# Patient Record
Sex: MALE | Race: OTHER | Employment: UNEMPLOYED | URBAN - METROPOLITAN AREA
[De-identification: names, ages, dates, MRNs, and addresses within clinical notes are randomized per-mention and may not be internally consistent; named-entity substitution may affect disease eponyms.]

---

## 2023-01-15 ENCOUNTER — APPOINTMENT (OUTPATIENT)
Dept: GENERAL RADIOLOGY | Age: 68
DRG: 988 | End: 2023-01-15
Payer: MEDICAID

## 2023-01-15 ENCOUNTER — APPOINTMENT (OUTPATIENT)
Dept: CT IMAGING | Age: 68
DRG: 988 | End: 2023-01-15
Payer: MEDICAID

## 2023-01-15 ENCOUNTER — HOSPITAL ENCOUNTER (INPATIENT)
Age: 68
LOS: 1 days | Discharge: HOME | DRG: 988 | End: 2023-01-16
Attending: EMERGENCY MEDICINE | Admitting: FAMILY MEDICINE
Payer: MEDICAID

## 2023-01-15 DIAGNOSIS — C79.9 METASTATIC MALIGNANT NEOPLASM, UNSPECIFIED SITE (HCC): Primary | ICD-10-CM

## 2023-01-15 DIAGNOSIS — R06.02 SHORTNESS OF BREATH: ICD-10-CM

## 2023-01-15 PROBLEM — R62.7 FTT (FAILURE TO THRIVE) IN ADULT: Status: ACTIVE | Noted: 2023-01-15

## 2023-01-15 LAB
ABSOLUTE EOS #: 0.23 K/UL (ref 0–0.44)
ABSOLUTE IMMATURE GRANULOCYTE: 0.09 K/UL (ref 0–0.3)
ABSOLUTE LYMPH #: 2.68 K/UL (ref 1.1–3.7)
ABSOLUTE MONO #: 1.38 K/UL (ref 0.1–1.2)
ANION GAP SERPL CALCULATED.3IONS-SCNC: 9 MMOL/L (ref 9–17)
BASOPHILS # BLD: 1 % (ref 0–2)
BASOPHILS ABSOLUTE: 0.13 K/UL (ref 0–0.2)
BUN BLDV-MCNC: 12 MG/DL (ref 8–23)
BUN/CREAT BLD: 14 (ref 9–20)
CALCIUM SERPL-MCNC: 9.4 MG/DL (ref 8.6–10.4)
CHLORIDE BLD-SCNC: 102 MMOL/L (ref 98–107)
CO2: 29 MMOL/L (ref 20–31)
CREAT SERPL-MCNC: 0.87 MG/DL (ref 0.7–1.2)
EOSINOPHILS RELATIVE PERCENT: 2 % (ref 1–4)
FLU A ANTIGEN: NEGATIVE
FLU B ANTIGEN: NEGATIVE
GFR SERPL CREATININE-BSD FRML MDRD: >60 ML/MIN/1.73M2
GLUCOSE BLD-MCNC: 115 MG/DL (ref 70–99)
HCT VFR BLD CALC: 46.4 % (ref 40.7–50.3)
HEMOGLOBIN: 14.4 G/DL (ref 13–17)
IMMATURE GRANULOCYTES: 1 %
LYMPHOCYTES # BLD: 18 % (ref 24–43)
MCH RBC QN AUTO: 28.2 PG (ref 25.2–33.5)
MCHC RBC AUTO-ENTMCNC: 31 G/DL (ref 28.4–34.8)
MCV RBC AUTO: 90.8 FL (ref 82.6–102.9)
MONOCYTES # BLD: 9 % (ref 3–12)
MYOGLOBIN: 49 NG/ML (ref 28–72)
MYOGLOBIN: 50 NG/ML (ref 28–72)
NRBC AUTOMATED: 0 PER 100 WBC
PDW BLD-RTO: 13.6 % (ref 11.8–14.4)
PLATELET # BLD: 507 K/UL (ref 138–453)
PMV BLD AUTO: 9.1 FL (ref 8.1–13.5)
POTASSIUM SERPL-SCNC: 4.2 MMOL/L (ref 3.7–5.3)
PRO-BNP: 202 PG/ML
RBC # BLD: 5.11 M/UL (ref 4.21–5.77)
SARS-COV-2, RAPID: NOT DETECTED
SEG NEUTROPHILS: 69 % (ref 36–65)
SEGMENTED NEUTROPHILS ABSOLUTE COUNT: 10.43 K/UL (ref 1.5–8.1)
SODIUM BLD-SCNC: 140 MMOL/L (ref 135–144)
SPECIMEN DESCRIPTION: NORMAL
TROPONIN, HIGH SENSITIVITY: 8 NG/L (ref 0–22)
TROPONIN, HIGH SENSITIVITY: 8 NG/L (ref 0–22)
WBC # BLD: 14.9 K/UL (ref 3.5–11.3)

## 2023-01-15 PROCEDURE — 94761 N-INVAS EAR/PLS OXIMETRY MLT: CPT

## 2023-01-15 PROCEDURE — 73700 CT LOWER EXTREMITY W/O DYE: CPT

## 2023-01-15 PROCEDURE — 6360000002 HC RX W HCPCS: Performed by: NURSE PRACTITIONER

## 2023-01-15 PROCEDURE — 80048 BASIC METABOLIC PNL TOTAL CA: CPT

## 2023-01-15 PROCEDURE — 73502 X-RAY EXAM HIP UNI 2-3 VIEWS: CPT

## 2023-01-15 PROCEDURE — 93005 ELECTROCARDIOGRAM TRACING: CPT | Performed by: PHYSICIAN ASSISTANT

## 2023-01-15 PROCEDURE — 6360000002 HC RX W HCPCS: Performed by: PHYSICIAN ASSISTANT

## 2023-01-15 PROCEDURE — 85025 COMPLETE CBC W/AUTO DIFF WBC: CPT

## 2023-01-15 PROCEDURE — 1200000000 HC SEMI PRIVATE

## 2023-01-15 PROCEDURE — 6370000000 HC RX 637 (ALT 250 FOR IP): Performed by: NURSE PRACTITIONER

## 2023-01-15 PROCEDURE — 2580000003 HC RX 258: Performed by: NURSE PRACTITIONER

## 2023-01-15 PROCEDURE — 83874 ASSAY OF MYOGLOBIN: CPT

## 2023-01-15 PROCEDURE — 94640 AIRWAY INHALATION TREATMENT: CPT

## 2023-01-15 PROCEDURE — 99223 1ST HOSP IP/OBS HIGH 75: CPT | Performed by: NURSE PRACTITIONER

## 2023-01-15 PROCEDURE — 2580000003 HC RX 258: Performed by: PHYSICIAN ASSISTANT

## 2023-01-15 PROCEDURE — 83880 ASSAY OF NATRIURETIC PEPTIDE: CPT

## 2023-01-15 PROCEDURE — 71045 X-RAY EXAM CHEST 1 VIEW: CPT

## 2023-01-15 PROCEDURE — 87635 SARS-COV-2 COVID-19 AMP PRB: CPT

## 2023-01-15 PROCEDURE — 96374 THER/PROPH/DIAG INJ IV PUSH: CPT

## 2023-01-15 PROCEDURE — 6360000004 HC RX CONTRAST MEDICATION: Performed by: PHYSICIAN ASSISTANT

## 2023-01-15 PROCEDURE — 99255 IP/OBS CONSLTJ NEW/EST HI 80: CPT | Performed by: INTERNAL MEDICINE

## 2023-01-15 PROCEDURE — 84484 ASSAY OF TROPONIN QUANT: CPT

## 2023-01-15 PROCEDURE — 87804 INFLUENZA ASSAY W/OPTIC: CPT

## 2023-01-15 PROCEDURE — 71260 CT THORAX DX C+: CPT | Performed by: PHYSICIAN ASSISTANT

## 2023-01-15 PROCEDURE — 99285 EMERGENCY DEPT VISIT HI MDM: CPT

## 2023-01-15 RX ORDER — SODIUM CHLORIDE 9 MG/ML
INJECTION, SOLUTION INTRAVENOUS CONTINUOUS
Status: DISCONTINUED | OUTPATIENT
Start: 2023-01-15 | End: 2023-01-16 | Stop reason: HOSPADM

## 2023-01-15 RX ORDER — SODIUM CHLORIDE 0.9 % (FLUSH) 0.9 %
10 SYRINGE (ML) INJECTION ONCE
Status: COMPLETED | OUTPATIENT
Start: 2023-01-15 | End: 2023-01-15

## 2023-01-15 RX ORDER — ALBUTEROL SULFATE 2.5 MG/3ML
2.5 SOLUTION RESPIRATORY (INHALATION) ONCE
Status: COMPLETED | OUTPATIENT
Start: 2023-01-15 | End: 2023-01-15

## 2023-01-15 RX ORDER — 0.9 % SODIUM CHLORIDE 0.9 %
80 INTRAVENOUS SOLUTION INTRAVENOUS ONCE
Status: COMPLETED | OUTPATIENT
Start: 2023-01-15 | End: 2023-01-15

## 2023-01-15 RX ORDER — SODIUM CHLORIDE 0.9 % (FLUSH) 0.9 %
10 SYRINGE (ML) INJECTION PRN
Status: DISCONTINUED | OUTPATIENT
Start: 2023-01-15 | End: 2023-01-16 | Stop reason: HOSPADM

## 2023-01-15 RX ORDER — ALBUTEROL SULFATE 2.5 MG/3ML
2.5 SOLUTION RESPIRATORY (INHALATION)
Status: DISCONTINUED | OUTPATIENT
Start: 2023-01-15 | End: 2023-01-16 | Stop reason: HOSPADM

## 2023-01-15 RX ORDER — LEVOFLOXACIN 500 MG/1
750 TABLET, FILM COATED ORAL DAILY
Status: DISCONTINUED | OUTPATIENT
Start: 2023-01-15 | End: 2023-01-16 | Stop reason: HOSPADM

## 2023-01-15 RX ORDER — MAGNESIUM SULFATE 1 G/100ML
1000 INJECTION INTRAVENOUS PRN
Status: DISCONTINUED | OUTPATIENT
Start: 2023-01-15 | End: 2023-01-16 | Stop reason: HOSPADM

## 2023-01-15 RX ORDER — POTASSIUM CHLORIDE 20 MEQ/1
40 TABLET, EXTENDED RELEASE ORAL PRN
Status: DISCONTINUED | OUTPATIENT
Start: 2023-01-15 | End: 2023-01-16 | Stop reason: HOSPADM

## 2023-01-15 RX ORDER — ACETAMINOPHEN 650 MG/1
650 SUPPOSITORY RECTAL EVERY 6 HOURS PRN
Status: DISCONTINUED | OUTPATIENT
Start: 2023-01-15 | End: 2023-01-16 | Stop reason: HOSPADM

## 2023-01-15 RX ORDER — POTASSIUM CHLORIDE 7.45 MG/ML
10 INJECTION INTRAVENOUS PRN
Status: DISCONTINUED | OUTPATIENT
Start: 2023-01-15 | End: 2023-01-16 | Stop reason: HOSPADM

## 2023-01-15 RX ORDER — ENOXAPARIN SODIUM 100 MG/ML
40 INJECTION SUBCUTANEOUS DAILY
Status: DISCONTINUED | OUTPATIENT
Start: 2023-01-15 | End: 2023-01-16 | Stop reason: HOSPADM

## 2023-01-15 RX ORDER — 0.9 % SODIUM CHLORIDE 0.9 %
1000 INTRAVENOUS SOLUTION INTRAVENOUS ONCE
Status: COMPLETED | OUTPATIENT
Start: 2023-01-15 | End: 2023-01-15

## 2023-01-15 RX ORDER — ACETAMINOPHEN 325 MG/1
650 TABLET ORAL EVERY 6 HOURS PRN
Status: DISCONTINUED | OUTPATIENT
Start: 2023-01-15 | End: 2023-01-16 | Stop reason: HOSPADM

## 2023-01-15 RX ORDER — OXYCODONE HYDROCHLORIDE AND ACETAMINOPHEN 5; 325 MG/1; MG/1
1 TABLET ORAL EVERY 4 HOURS PRN
Status: DISCONTINUED | OUTPATIENT
Start: 2023-01-15 | End: 2023-01-16 | Stop reason: HOSPADM

## 2023-01-15 RX ORDER — SODIUM CHLORIDE 9 MG/ML
INJECTION, SOLUTION INTRAVENOUS PRN
Status: DISCONTINUED | OUTPATIENT
Start: 2023-01-15 | End: 2023-01-16 | Stop reason: HOSPADM

## 2023-01-15 RX ORDER — DIPHENHYDRAMINE HYDROCHLORIDE 50 MG/ML
12.5 INJECTION INTRAMUSCULAR; INTRAVENOUS ONCE
Status: COMPLETED | OUTPATIENT
Start: 2023-01-15 | End: 2023-01-15

## 2023-01-15 RX ORDER — FENTANYL CITRATE 0.05 MG/ML
50 INJECTION, SOLUTION INTRAMUSCULAR; INTRAVENOUS
Status: DISCONTINUED | OUTPATIENT
Start: 2023-01-15 | End: 2023-01-16 | Stop reason: HOSPADM

## 2023-01-15 RX ORDER — IPRATROPIUM BROMIDE AND ALBUTEROL SULFATE 2.5; .5 MG/3ML; MG/3ML
1 SOLUTION RESPIRATORY (INHALATION)
Status: DISCONTINUED | OUTPATIENT
Start: 2023-01-15 | End: 2023-01-16

## 2023-01-15 RX ORDER — ONDANSETRON 2 MG/ML
4 INJECTION INTRAMUSCULAR; INTRAVENOUS EVERY 6 HOURS PRN
Status: DISCONTINUED | OUTPATIENT
Start: 2023-01-15 | End: 2023-01-16 | Stop reason: HOSPADM

## 2023-01-15 RX ORDER — POLYETHYLENE GLYCOL 3350 17 G/17G
17 POWDER, FOR SOLUTION ORAL DAILY PRN
Status: DISCONTINUED | OUTPATIENT
Start: 2023-01-15 | End: 2023-01-16 | Stop reason: HOSPADM

## 2023-01-15 RX ORDER — ONDANSETRON 4 MG/1
4 TABLET, ORALLY DISINTEGRATING ORAL EVERY 8 HOURS PRN
Status: DISCONTINUED | OUTPATIENT
Start: 2023-01-15 | End: 2023-01-16 | Stop reason: HOSPADM

## 2023-01-15 RX ORDER — KETOROLAC TROMETHAMINE 15 MG/ML
15 INJECTION, SOLUTION INTRAMUSCULAR; INTRAVENOUS EVERY 6 HOURS PRN
Status: DISCONTINUED | OUTPATIENT
Start: 2023-01-15 | End: 2023-01-16 | Stop reason: HOSPADM

## 2023-01-15 RX ORDER — SODIUM CHLORIDE 0.9 % (FLUSH) 0.9 %
5-40 SYRINGE (ML) INJECTION EVERY 12 HOURS SCHEDULED
Status: DISCONTINUED | OUTPATIENT
Start: 2023-01-15 | End: 2023-01-16 | Stop reason: HOSPADM

## 2023-01-15 RX ORDER — SODIUM CHLORIDE 9 MG/ML
INJECTION, SOLUTION INTRAVENOUS CONTINUOUS
Status: DISCONTINUED | OUTPATIENT
Start: 2023-01-15 | End: 2023-01-15

## 2023-01-15 RX ADMIN — SODIUM CHLORIDE 1000 ML: 9 INJECTION, SOLUTION INTRAVENOUS at 12:46

## 2023-01-15 RX ADMIN — SODIUM CHLORIDE: 9 INJECTION, SOLUTION INTRAVENOUS at 13:26

## 2023-01-15 RX ADMIN — SODIUM CHLORIDE 80 ML: 9 INJECTION, SOLUTION INTRAVENOUS at 13:47

## 2023-01-15 RX ADMIN — IOPAMIDOL 75 ML: 755 INJECTION, SOLUTION INTRAVENOUS at 13:47

## 2023-01-15 RX ADMIN — DIPHENHYDRAMINE HYDROCHLORIDE 12.5 MG: 50 INJECTION, SOLUTION INTRAMUSCULAR; INTRAVENOUS at 14:49

## 2023-01-15 RX ADMIN — LEVOFLOXACIN 750 MG: 500 TABLET, FILM COATED ORAL at 21:34

## 2023-01-15 RX ADMIN — ALBUTEROL SULFATE 2.5 MG: 2.5 SOLUTION RESPIRATORY (INHALATION) at 15:40

## 2023-01-15 RX ADMIN — IPRATROPIUM BROMIDE AND ALBUTEROL SULFATE 1 AMPULE: 2.5; .5 SOLUTION RESPIRATORY (INHALATION) at 18:11

## 2023-01-15 RX ADMIN — OXYCODONE AND ACETAMINOPHEN 1 TABLET: 5; 325 TABLET ORAL at 21:43

## 2023-01-15 RX ADMIN — SODIUM CHLORIDE, PRESERVATIVE FREE 10 ML: 5 INJECTION INTRAVENOUS at 13:47

## 2023-01-15 RX ADMIN — SODIUM CHLORIDE: 9 INJECTION, SOLUTION INTRAVENOUS at 21:39

## 2023-01-15 RX ADMIN — ENOXAPARIN SODIUM 40 MG: 100 INJECTION SUBCUTANEOUS at 21:39

## 2023-01-15 ASSESSMENT — PAIN SCALES - GENERAL
PAINLEVEL_OUTOF10: 8
PAINLEVEL_OUTOF10: 7

## 2023-01-15 ASSESSMENT — ENCOUNTER SYMPTOMS
VOMITING: 1
BACK PAIN: 1
COUGH: 1
ABDOMINAL PAIN: 1
NAUSEA: 1
PHOTOPHOBIA: 0
SINUS PAIN: 0
CHEST TIGHTNESS: 1
SINUS PRESSURE: 0
SHORTNESS OF BREATH: 1
DIARRHEA: 0
COLOR CHANGE: 0

## 2023-01-15 ASSESSMENT — PAIN DESCRIPTION - LOCATION: LOCATION: HIP

## 2023-01-15 ASSESSMENT — PAIN DESCRIPTION - ORIENTATION: ORIENTATION: LEFT

## 2023-01-15 ASSESSMENT — PAIN - FUNCTIONAL ASSESSMENT: PAIN_FUNCTIONAL_ASSESSMENT: 0-10

## 2023-01-15 NOTE — ED NOTES
Pt states chest pain on left side started a couple days ago. Pt states the pain radiates from left should down the arm. Pt states he has tender left hip pain when touched. Pt has nonproductive cough with congestion. Pt states he has no prior cardiac hx. Pt placed on cardiac monitor. Family at bedside. Will continue to monitor.      Bhavin Coyne RN  01/15/23 2640

## 2023-01-15 NOTE — PROGRESS NOTES
Patient to floor via wheelchair from ED. Patient oriented to the room,given call light and made comfortable. VVS  Consultations complete. Bed locked and lowered. Area well lit and free of clutter. Family in room.

## 2023-01-15 NOTE — ED NOTES
ED to inpatient nurses report     Chief Complaint   Patient presents with    Cough    Shortness of Breath    Chest Pain      Present to ED from home with chest pain on the left side that radiated down left arm and shortness of breath. Pt states they were recently see at [facility] within the last couple days but unable to find the encounter.   LOC: alert and orientated to name, place, date  Vital signs   Vitals:    01/15/23 1420 01/15/23 1438 01/15/23 1439 01/15/23 1445   BP: 109/77 116/85  122/89   Pulse: 82  82 80   Resp: 18  15 14   Temp:       TempSrc:       SpO2: 97%  98% 96%   Weight:       Height:          Oxygen Baseline RA    Current needs required none       LDAs:   Peripheral IV 01/15/23 Right Antecubital (Active)     Mobility: Independent  Fall Risk:    Pending ED orders: none  Present condition: stable    Consults: IP CONSULT TO HOSPITALIST  [x]  Hospitalist  Completed  [] yes [] no Who:   []  Medicine  Completed  [] yes [] No Who:   []  Cardiology  Completed  [] yes [] No Who:   []  GI   Completed  [] yes [] No Who:   []  Neurology  Completed  [] yes [] No Who:   []  Nephrology Completed  [] yes [] No Who:    []  Vascular  Completed  [] yes [] No Who:   []  Ortho  Completed  [] yes [] No Who:     []  Surgery  Completed  [] yes [] No Who:    []  Urology  Completed  [] yes [] No Who:    []  CT Surgery Completed  [] yes [] No Who:   []  Podiatry  Completed  [] yes [] No Who:    []  Other    Completed  [] yes [] No Who:  Interventions: IV Fluids, COVID, FLU, CT CHEST PE, CXR and XR Lft hip, CT Lft hip  Important Events: CXR showed mass, Hip XR possible fracture, CT of hip negative for fracture, CT PE negative for PE but showed mass, WBC 14.9, COVID/FLU negative       Electronically signed by Mikayla Allison RN on 1/15/2023 at 3:20 PM     Mikayla Allison RN  01/15/23 0369

## 2023-01-15 NOTE — ED PROVIDER NOTES
eMERGENCY dEPARTMENT eNCOUnter   [address] [place] Name: Katherine Kim  MRN: [id number]  [place] 1955  Date of evaluation: 1/15/23     Katherine Kim is a 74 y.o. male with CC: Cough, Shortness of Breath, and Chest Pain        This visit was performed by both a physician and an APC. I performed all aspects of the MDM as documented. Dr. Joe Thapa called to give me some background on the patient's. Apparently the patient was recently diagnosed with a lung mass with metastases.     The care is provided during an unprecedented national emergency due to the novel coronavirus, Guillermo Mars MD  Attending Emergency Physician           Christina Gutierrez MD  01/15/23 4712       Christina Gutierrez MD  01/15/23 4556

## 2023-01-15 NOTE — PLAN OF CARE
Problem: Respiratory - Adult  Goal: Achieves optimal ventilation and oxygenation  1/15/2023 1819 by William Lewis RCP  Outcome: Progressing  1/15/2023 1819 by William Lewis RCP  Outcome: Progressing  Flowsheets (Taken 1/15/2023 1819)  Achieves optimal ventilation and oxygenation:   Assess for changes in respiratory status   Respiratory therapy support as indicated  Note: BRONCHOSPASM/BRONCHOCONSTRICTION    IMPROVE  AERATION/BREATHSOUNDS  ADMINISTER BRONCHODILATOR THERAPY AS APPROPRIATE  ASSESS BREATH SOUNDS  PATIENT EDUCATION AS NEEDED

## 2023-01-15 NOTE — ED NOTES
Pt states he has two new red bumps on side of face and one on the neck. Pt denies any known allergies. Pt denies itching. M. Holley Sandhoff PA notified. Will continue to monitor.      Miky Reynolds RN  01/15/23 4934

## 2023-01-15 NOTE — PLAN OF CARE
Problem: Respiratory - Adult  Goal: Achieves optimal ventilation and oxygenation  1/15/2023 1819 by Buffy Fabian RCP  Outcome: Progressing

## 2023-01-15 NOTE — CONSULTS
_                         Today's Date: 1/15/2023  Patient Name: Nik Camara  Date of admission: 1/15/2023 12:28 PM  Patient's age: 74 y.o., 1955  Admission Dx: Shortness of breath [R06.02]  Intractable pain [R52]  Metastatic malignant neoplasm, unspecified site [facility]) [C79.9]      Requesting Physician: Nathan Reeder MD    CHIEF COMPLAINT: Chest pain. Pelvic pain. Consult for lung mass. History Obtained From:  patient, electronic medical record    HISTORY OF PRESENT ILLNESS:      The patient is a 74 y.o. [address] [facility] male who is admitted to the hospital for further management of newly diagnosed right lung mass. Patient has history of chronic tobacco abuse. He smokes about 2 packs/day for the last 40 years or more. Patient had no cough sputum or hemoptysis. He had chest pain mainly left side on and off for the last few days. He also had pain in the hips over the last several weeks. Pain is mainly in the left pelvic bone. No other complaints today for some time. No GI symptoms. No nausea or vomiting. No urinary symptoms. No other complaints. He was seen emergency room and he had CT scan of the chest PE protocol. No evidence of pulmonary embolism. He had mediastinal right hilar adenopathy. He had a right upper lobe mass in the medial aspect measuring 2.4 x 1.4 cm. He had a right upper lobe perihilar mass with invasion into mediastinum measuring 3 x 3.9 x 4 cm. Pelvic CT scan showed osteolytic and osteoclastic lesions. Smoker of 2 packs/day for the last 40 years. Past Medical History:   has no past medical history on file. Past Surgical History:   has no past surgical history on file. Family History: family history is not on file. Social History:   reports that he has been smoking cigarettes. He has been smoking an average of 2 packs per day.  He has never used smokeless tobacco. He reports that he does not drink alcohol and does not use drugs.    Medications:    Prior to Admission medications    Not on File     Current Facility-Administered Medications   Medication Dose Route Frequency Provider Last Rate Last Admin    sodium chloride flush 0.9 % injection 5-40 mL  5-40 mL IntraVENous 2 times per day Roselia Newness, APRN - NP        sodium chloride flush 0.9 % injection 10 mL  10 mL IntraVENous PRN Roselia Newness, APRN - NP        0.9 % sodium chloride infusion   IntraVENous PRN Roselia Newness, APRN - NP        potassium chloride (KLOR-CON M) extended release tablet 40 mEq  40 mEq Oral PRN Roselia Newness, APRN - NP        Or    potassium bicarb-citric acid (EFFER-K) effervescent tablet 40 mEq  40 mEq Oral PRN Roselia Newness, APRN - NP        Or    potassium chloride 10 mEq/100 mL IVPB (Peripheral Line)  10 mEq IntraVENous PRN [facility], APRN - NP        magnesium sulfate 1000 mg in dextrose 5% 100 mL IVPB  1,000 mg IntraVENous PRN Roselia [facility], APRN - NP        enoxaparin (LOVENOX) injection 40 mg  40 mg SubCUTAneous Daily [facility], APRN - NP        ondansetron (ZOFRAN-ODT) disintegrating tablet 4 mg  4 mg Oral Q8H PRN Roselia [facility], APRN - NP        Or    ondansetron (ZOFRAN) injection 4 mg  4 mg IntraVENous Q6H PRN Roselia [facility], APRN - NP        polyethylene glycol (GLYCOLAX) packet 17 g  17 g Oral Daily PRN Roselia Newness, APRN - NP        acetaminophen (TYLENOL) tablet 650 mg  650 mg Oral Q6H PRN Roselia Newness, APRN - NP        Or    acetaminophen (TYLENOL) suppository 650 mg  650 mg Rectal Q6H PRN Roselia [facility], APRN - NP        ipratropium-albuterol (DUONEB) nebulizer solution 1 ampule  1 ampule Inhalation Q4H WA Roselia Newness, APRN - NP        oxyCODONE-acetaminophen (PERCOCET) 5-325 MG per tablet 1 tablet  1 tablet Oral Q4H PRN Roselia Newness, APRN - NP        fentaNYL (SUBLIMAZE) injection 50 mcg  50 mcg IntraVENous Q1H PRN Roselia Newness, APRN - NP        ketorolac (TORADOL) injection 15 mg  15 mg IntraVENous Q6H PRN Minus Pee, APRN - NP        levoFLOXacin [facility]) tablet 750 mg  750 mg Oral Daily Minus Pee, APRN - NP        0.9 % sodium chloride infusion   IntraVENous Continuous Minus Pee, APRN - NP           Allergies:  Patient has no known allergies. REVIEW OF SYSTEMS:      General: Positive for weakness and fatigue. No unanticipated weight loss or decreased appetite. No fever or chills. Eyes: No blurred vision, eye pain or double vision. Ears: No hearing problems or drainage. No tinnitus. Throat: No sore throat, problems with swallowing or dysphagia. Respiratory: As above. No cough, sputum or hemoptysis. No shortness of breath. No pleuritic chest pain. Cardiovascular: No chest pain, orthopnea or PND. No lower extremity edema. No palpitation. Gastrointestinal: No problems with swallowing. No abdominal pain or bloating. No nausea or vomiting. No diarrhea or constipation. No GI bleeding. Genitourinary: No dysuria, hematuria, frequency or urgency. Musculoskeletal: As above. .  Dermatologic: No skin rashes or pruritus. No skin lesions or discolorations. Psychiatric: No depression, anxiety, or stress or signs of schizophrenia. No change in mood or affect. Hematologic: No history of bleeding tendency. No bruises or ecchymosis. No history of clotting problems. Infectious disease: No fever, chills or frequent infections. Endocrine: No polydipsia or polyuria. No temperature intolerance. Neurologic: No headaches or dizziness. No weakness or numbness of the extremities. No changes in balance, coordination,  memory, mentation, behavior. Allergic/Immunologic: No nasal congestion or hives. No repeated infections.        PHYSICAL EXAM:      /79   Pulse 87   Temp 99 °F (37.2 °C) (Oral)   Resp 18   Ht 5' 6\" (1.676 m)   Wt 163 lb (73.9 kg)   SpO2 95%   BMI 26.31 kg/m²    Temp (24hrs), Av.8 °F (37.1 °C), Min:98.5 °F (36.9 °C), Max:99 °F (37.2 °C)      General appearance - not in pain or distress  Mental status - alert and oriented  Eyes - pupils equal and reactive, extraocular eye movements intact  Ears - bilateral TM's and external ear canals normal  Nose - normal and patent, no erythema, discharge or polyps  Mouth - mucous membranes moist, pharynx normal without lesions  Neck - supple, no significant adenopathy  Lymphatics - no palpable lymphadenopathy, no hepatosplenomegaly  Chest - clear to auscultation, no wheezes, rales or rhonchi, symmetric air entry  Heart - normal rate, regular rhythm, normal S1, S2, no murmurs, rubs, clicks or gallops  Abdomen - soft, nontender, nondistended, no masses or organomegaly  Neurological - alert, oriented, normal speech, no focal findings or movement disorder noted  Musculoskeletal -tenderness over the left pelvic bone. No deformity or swelling  Extremities - peripheral pulses normal, no pedal edema, no clubbing or cyanosis  Skin - normal coloration and turgor, no rashes, no suspicious skin lesions noted           DATA:      Labs:       CBC:   Recent Labs     01/15/23  1245   WBC 14.9*   HGB 14.4   HCT 46.4   *     BMP:   Recent Labs     01/15/23  1245      K 4.2   CO2 29   BUN 12   CREATININE 0.87   LABGLOM >60   GLUCOSE 115*     PT/INR: No results for input(s): PROTIME, INR in the last 72 hours. APTT:No results for input(s): APTT in the last 72 hours. LIVER PROFILE:No results for input(s): AST, ALT, LABALBU in the last 72 hours. CT CHEST PULMONARY EMBOLISM W CONTRAST  Narrative: EXAMINATION:  CTA OF THE CHEST 1/15/2023 1:33 pm    TECHNIQUE:  CTA of the chest was performed after the administration of intravenous  contrast.  Multiplanar reformatted images are provided for review. MIP  images are provided for review.  Automated exposure control, iterative  reconstruction, and/or weight based adjustment of the mA/kV was utilized to  reduce the radiation dose to as low as reasonably achievable. COMPARISON:  None. HISTORY:  ORDERING SYSTEM PROVIDED HISTORY: mass. ..rule out pe and pneumonia as wel  TECHNOLOGIST PROVIDED HISTORY:  mass. ..rule out pe and pneumonia as wel  Decision Support Exception - unselect if not a suspected or confirmed  emergency medical condition->Emergency Medical Condition (MA)  Reason for Exam: Mass? R/o PE and pneumonia. Chest pain, SOB, cough    FINDINGS:  Pulmonary Arteries: Pulmonary arteries are adequately opacified for  evaluation. No evidence of intraluminal filling defect to suggest pulmonary  embolism. Main pulmonary artery is normal in caliber. Mediastinum: Mediastinal and right hilar adenopathy are noted. No acute  aortic abnormality. Heart size is normal.  No pericardial effusion or  epicardial adenopathy is noted. Esophageal walls are thickened mid and  distal area. Hiatal hernia is noted with asymmetry in the walls. Underlying  mass difficult to exclude. Lungs/pleura: Emphysematous changes are present in the lungs. A mass in the  right upper lobe medially of 2.3 x 1.4 cm is noted image 29, series 4. A  right perihilar upper lobe mass with invasion into the mediastinum of 3.0 x  3.9 x 4 cm is noted. Attenuation of the bronchi to the right upper and right  lower lobes is noted due to the mass and mediastinal adenopathy. Trachea is  patent. No effusion. Right lower lobe subpleural nodule of 4.8 mm. Upper Abdomen: Hepatic steatosis is noted. Atherosclerotic calcification of  the aorta and major branches is noted. Soft Tissues/Bones: Multilevel degenerative changes are present in the spine. No axillary adenopathy. Lytic destruction of 2.3 cm is noted T10. Impression: 1. No evidence of pulmonary embolus. 2.  Mediastinal right hilar adenopathy. 3.  Right upper lobe mass medial aspect 2.3 x 1.4 cm. 4.  Right upper lobe perihilar mass with invasion into the mediastinum a 3 x  3.9 x 4 cm.     5.  Right lower lobe subpleural nodule 4.8 mm.    6.  Hepatic steatosis.  Atherosclerotic disease.    7.  Lytic lesion T10.    RECOMMENDATIONS:  Advise consideration of tissue sampling of the right pulmonary masses.  PET-CT imaging may be considered.  CT HIP LEFT WO CONTRAST  Narrative: EXAMINATION:  CT OF THE LEFT HIP WITHOUT CONTRAST 1/15/2023 10:51 am    TECHNIQUE:  CT of the left hip was performed without the administration of intravenous  contrast.  Multiplanar reformatted images are provided for review. Automated  exposure control, iterative reconstruction, and/or weight based adjustment of  the mA/kV was utilized to reduce the radiation dose to as low as reasonably  achievable.    COMPARISON:  Left hip radiograph performed earlier today.    HISTORY  ORDERING SYSTEM PROVIDED HISTORY: rule out fx.  TECHNOLOGIST PROVIDED HISTORY: Rule out fx.  Decision Support Exception - unselect if not a suspected or confirmed  emergency medical condition->Emergency Medical Condition (MA)  Reason for Exam: Hip pain, r/o fracture    FINDINGS:  There is a lytic, destructive lesion seen within the anterior left iliac wing  measuring just under 2 cm (2, 18).  Another example of lytic lesion is seen  within the right parasymphyseal pubic bone measuring 2.4 cm (2, 71).  Additional lytic and sclerotic lesions are found throughout the pelvis.    With that said, no acute pelvic ring or obturator ring fractures are found.    Evaluation of the left hip shows no stress, insufficiency, or traumatic  fracture.  No trochanteric fractures found.  Finding on the radiograph was  artifactual.  No joint effusion is seen on the left.  Mild degenerative  changes are noted.    On the right, no stress, insufficiency, or traumatic fractures.  Mild  degenerative changes.  No joint effusion.    Limited imaging of the pelvic viscera is unremarkable.  Impression: No acute left hip abnormality identified.  Specifically, no fracture  identified.  The finding on the radiograph was  artifactual.    Multifocal lytic and sclerotic lesions throughout the pelvis, compatible with  bony metastatic disease. XR HIP LEFT (2-3 VIEWS)  Narrative: EXAMINATION:  TWO XRAY VIEWS OF THE LEFT HIP    1/15/2023 12:52 pm    COMPARISON:  None. HISTORY:  ORDERING SYSTEM PROVIDED HISTORY: Pain  TECHNOLOGIST PROVIDED HISTORY:  Pain  Reason for Exam: Left hip pain    Initial encounter    FINDINGS:  Subtle cortical step-off is noted along the greater trochanter seen only on a  single frontal view. The left hip joint is otherwise maintained. No other  acute fracture or dislocation. Joint spaces and alignment are otherwise  maintained. Soft tissues are unremarkable. Impression: Subtle cortical step-off is noted along the greater trochanter seen only on  single frontal view and a nondisplaced fracture is not excluded. Consider  further evaluation CT or MRI. XR CHEST PORTABLE  Narrative: EXAMINATION:  ONE XRAY VIEW OF THE CHEST    1/15/2023 12:52 pm    COMPARISON:  None. HISTORY:  ORDERING SYSTEM PROVIDED HISTORY: cough  TECHNOLOGIST PROVIDED HISTORY:  cough  Reason for Exam: Cough    History of tobacco abuse. FINDINGS:  Overlying ECG monitor leads. Cardiac silhouette WNL in size for AP  technique. Mediastinal structures midline; fullness right paratracheal soft  tissue structures. Right suprahilar opacity; differential includes infiltrate and mass. Remainder lung fields and costophrenic angles clear. Moderate DJD spine and mild degenerative changes acromioclavicular joints. Impression: Right suprahilar mass versus infiltrate with possible adjacent mild  mediastinal adenopathy. RECOMMENDATION:  CT of the chest recommended.             IMPRESSION:    Primary Problem  Metastatic disease [facility])    Active Hospital Problems    Diagnosis Date Noted    Intractable pain [R52] 01/15/2023     Priority: Medium    Lung mass [R91.8] 01/15/2023     Priority: Medium    Exertional dyspnea [R06.09] 01/15/2023 Priority: Medium    Thoracic back pain [M54.6] 01/15/2023     Priority: Medium    Nausea with vomiting [R11.2] 01/15/2023     Priority: Medium    Hip pain [M25.559] 01/15/2023     Priority: Medium    Metastatic disease ([address] [C79.9] 01/15/2023     Priority: Medium    FTT (failure to thrive) in adult [R62.7] 01/15/2023     Priority: Medium    Unintentional weight loss [R63.4] 01/15/2023     Priority: Medium    Tobacco abuse [Z72.0] 01/15/2023     Priority: Medium    Moderate protein-calorie malnutrition ([facility] [address] [E44.0] 01/15/2023     Priority: Medium       RECOMMENDATIONS:  Records and labs and images were reviewed and discussed with the patient and his son. The actual images were reviewed personally and they were explained to the patient and his son in [employer] language. Findings are most consistent with primary lung cancer with bone metastasis. Tissue diagnosis is needed. Considering the location of the tumor being centrally located I think he will be in need for bronchoscopy and biopsy. Pulmonary were consulted. Further recommendations will be based on the results of the biopsy and and further molecular genetic testing on the biopsy. In addition patient will have PET CT scan as outpatient. Pain control. All explained to the patient and his son. Patient's questions were answered to the best of her satisfaction and she verbalized full understanding and agreement. Thank you for allowing us to participate in the care of this pleasant patient. Discussed with patient and Nurse.     Regulo Tang MD, MD                            [address] [place] [address] [facility]                            This note is created with the assistance of a speech recognition program.  While intending to generate a document that actually reflects the content of the visit, the document can still have some errors including those of syntax and sound a like substitutions which may escape proof reading. It such instances, actual meaning can be extrapolated by contextual diversion.

## 2023-01-15 NOTE — H&P
[facility]  Office: [address], DO, Cynthia Hoover, DO, Vishnu Ariza, DO, Pao Davis Blood, DO, Edward Velarde MD, Milena Baum MD, Yung Loza MD, Jey Monte MD,  Mirian Cazares MD, John Lam MD, Elina King, DO, Joanie Denise MD,  Thomas Brooks MD, Montana Linares MD, Matty Baez, DO, Nova Mejía MD, Kush Jaeger MD, Mulu Baxter DO, Mathew Schreiber MD, Gretta Massey MD, Marla Spears MD, Stacy Clinton MD, Abeba Pedersen DO, Yasmin Barlow MD, Jorje Robles MD, Daryle Springer, CNP,  Kb Suazo, CNP, Tricia Waddell, CNP, Edgar Jensen, CNP,  Norris Nye, [facility], Sin Chávez, CNP, Gracy Gray, CNP, Rom Cervantes, CNP, Victory Damon, CNP, Luda Leonard, CNP, Juli Fish PA-C, Holger Bright, CNS, Edi Carrasco, CNP, Jorden Alex, [facility]    HISTORY AND PHYSICAL EXAMINATION            Date:   1/15/2023  Patient name:  Gretta Villa  Date of admission:  1/15/2023 12:28 PM  MRN:   [id number]  Account:  [de-identified]  YOB: 1955  PCP:    No primary care provider on file. Room:   [facility] TO/TO  Code Status:    No Order    Chief Complaint:     Chief Complaint   Patient presents with    Cough    Shortness of Breath    Chest Pain       History Obtained From:     patient    History of Present Illness:     Gretta Villa is a 74 y.o. Non- / non  male who presents with Cough, Shortness of Breath, and Chest Pain   and is admitted to the hospital for the management of Metastatic disease ([facility] [address] Patient reports to the emergency department with pelvis and back pain as well as shortness of breath with a cough.   Patient has been suffering from back pain for the past several months and his pain became so severe yesterday that he reported to [facility].  Ridging was completed and patient was found to have a lung mass on CT and he was advised to follow-up with oncology as an outpatient. Patient returns to the emergency department today with persistent pain and shortness of breath. Patient endorses exertional dyspnea which is debilitating and severe pain with ambulation. Additional imaging is completed by our emergency department team and patient is found to have a large right-sided lung mass with nodules as well as areas of concern for metastasis in the thoracic spine as well as the pelvis. Emergency department evaluation is completed and patient is hemodynamically stable however he is unable to ambulate without debilitating exertional dyspnea and hip pain. Further social history reveals but the patient has been smoking 2 packs of cigarettes per day for many years. He endorses near complete loss of appetite and nausea with vomiting at mealtimes. He also endorses that his close no longer fit because he is losing weight however he does not know how much she has lost or over what timeframe. Past Medical History:     No known medical history    Past Surgical History:     No known prior history    Medications Prior to Admission:     Takes no medications    Allergies:     Patient has no known allergies. Social History:     Tobacco:    reports that he has been smoking cigarettes. He has been smoking an average of 2 packs per day. He has never used smokeless tobacco.  Alcohol:      reports no history of alcohol use. Drug Use:  reports no history of drug use. Family History:     Patient reports he knows nothing of his family's medical history and believes his parents  of \"old age\". Review of Systems:     Positive and Negative as described in HPI. Review of Systems   Constitutional:  Positive for activity change. HENT:  Negative for sinus pressure and sinus pain. Eyes:  Negative for photophobia and visual disturbance. Respiratory:  Positive for cough, chest tightness and shortness of breath.     Gastrointestinal:  Positive for abdominal pain, nausea and vomiting. Negative for diarrhea. Endocrine: Negative for cold intolerance and heat intolerance. Genitourinary:  Positive for decreased urine volume. Negative for flank pain and frequency. Musculoskeletal:  Positive for arthralgias and back pain. Skin:  Negative for color change and rash. Neurological:  Positive for weakness. Physical Exam:   /81   Pulse 88   Temp 98.5 °F (36.9 °C) (Oral)   Resp 15   Ht 5' 6\" (1.676 m)   Wt 163 lb (73.9 kg)   SpO2 97%   BMI 26.31 kg/m²   Temp (24hrs), Av.5 °F (36.9 °C), Min:98.5 °F (36.9 °C), Max:98.5 °F (36.9 °C)    No results for input(s): POCGLU in the last 72 hours. No intake or output data in the 24 hours ending 01/15/23 1747    Physical Exam  HENT:      Head: Normocephalic. Nose: Nose normal.      Mouth/Throat:      Mouth: Mucous membranes are dry. Eyes:      Extraocular Movements: Extraocular movements intact. Pupils: Pupils are equal, round, and reactive to light. Cardiovascular:      Rate and Rhythm: Regular rhythm. Tachycardia present. Pulses: Normal pulses. Heart sounds: Normal heart sounds. No murmur heard. Pulmonary:      Effort: Respiratory distress present. Breath sounds: Rhonchi present. Abdominal:      General: Abdomen is flat. Bowel sounds are normal. There is no distension. Palpations: Abdomen is soft. Tenderness: There is no abdominal tenderness. Musculoskeletal:         General: No swelling or tenderness. Skin:     General: Skin is warm and dry. Capillary Refill: Capillary refill takes 2 to 3 seconds. Coloration: Skin is pale. Neurological:      General: No focal deficit present. Mental Status: He is alert and oriented to person, place, and time.        Investigations:      Laboratory Testing:  Recent Results (from the past 24 hour(s))   CBC with Auto Differential    Collection Time: 01/15/23 12:45 PM   Result Value Ref Range    WBC 14.9 (H) 3.5 - 11.3 k/uL    RBC 5.11 4.21 - 5.77 m/uL    Hemoglobin 14.4 13.0 - 17.0 g/dL    Hematocrit 46.4 40.7 - 50.3 %    MCV 90.8 82.6 - 102.9 fL    MCH 28.2 25.2 - 33.5 pg    MCHC 31.0 28.4 - 34.8 g/dL    RDW 13.6 11.8 - 14.4 %    Platelets 771 (H) 851 - 453 k/uL    MPV 9.1 8.1 - 13.5 fL    NRBC Automated 0.0 0.0 per 100 WBC    Seg Neutrophils 69 (H) 36 - 65 %    Lymphocytes 18 (L) 24 - 43 %    Monocytes 9 3 - 12 %    Eosinophils % 2 1 - 4 %    Basophils 1 0 - 2 %    Immature Granulocytes 1 (H) 0 %    Segs Absolute 10.43 (H) 1.50 - 8.10 k/uL    Absolute Lymph # 2.68 1.10 - 3.70 k/uL    Absolute Mono # 1.38 (H) 0.10 - 1.20 k/uL    Absolute Eos # 0.23 0.00 - 0.44 k/uL    Basophils Absolute 0.13 0.00 - 0.20 k/uL    Absolute Immature Granulocyte 0.09 0.00 - 0.30 k/uL   Basic Metabolic Panel    Collection Time: 01/15/23 12:45 PM   Result Value Ref Range    Glucose 115 (H) 70 - 99 mg/dL    BUN 12 8 - 23 mg/dL    Creatinine 0.87 0.70 - 1.20 mg/dL    Est, Glom Filt Rate >60 >60 mL/min/1.73m2    Bun/Cre Ratio 14 9 - 20    Calcium 9.4 8.6 - 10.4 mg/dL    Sodium 140 135 - 144 mmol/L    Potassium 4.2 3.7 - 5.3 mmol/L    Chloride 102 98 - 107 mmol/L    CO2 29 20 - 31 mmol/L    Anion Gap 9 9 - 17 mmol/L   Brain Natriuretic Peptide    Collection Time: 01/15/23 12:45 PM   Result Value Ref Range    Pro- <300 pg/mL   Flu A/B Ag Detection    Collection Time: 01/15/23 12:45 PM    Specimen: Nasopharyngeal   Result Value Ref Range    Flu A Antigen NEGATIVE NEGATIVE    Flu B Antigen NEGATIVE NEGATIVE   COVID-19, Rapid    Collection Time: 01/15/23 12:45 PM    Specimen: Nasopharyngeal Swab   Result Value Ref Range    Specimen Description . NASOPHARYNGEAL SWAB     SARS-CoV-2, Rapid Not Detected Not Detected   TROP/MYOGLOBIN    Collection Time: 01/15/23 12:45 PM   Result Value Ref Range    Troponin, High Sensitivity 8 0 - 22 ng/L    Myoglobin 49 28 - 72 ng/mL   TROP/MYOGLOBIN    Collection Time: 01/15/23  2:45 PM   Result Value Ref Range Troponin, High Sensitivity 8 0 - 22 ng/L    Myoglobin 50 28 - 72 ng/mL       Imaging/Diagnostics:  XR HIP LEFT (2-3 VIEWS)    Result Date: 1/15/2023  Subtle cortical step-off is noted along the greater trochanter seen only on single frontal view and a nondisplaced fracture is not excluded. Consider further evaluation CT or MRI. XR CHEST PORTABLE    Result Date: 1/15/2023  Right suprahilar mass versus infiltrate with possible adjacent mild mediastinal adenopathy. RECOMMENDATION: CT of the chest recommended. CT CHEST PULMONARY EMBOLISM W CONTRAST    Result Date: 1/15/2023  1. No evidence of pulmonary embolus. 2.  Mediastinal right hilar adenopathy. 3.  Right upper lobe mass medial aspect 2.3 x 1.4 cm. 4.  Right upper lobe perihilar mass with invasion into the mediastinum a 3 x 3.9 x 4 cm. 5.  Right lower lobe subpleural nodule 4.8 mm. 6.  Hepatic steatosis. Atherosclerotic disease. 7.  Lytic lesion T10. RECOMMENDATIONS: Advise consideration of tissue sampling of the right pulmonary masses. PET-CT imaging may be considered. CT HIP LEFT WO CONTRAST    Result Date: 1/15/2023  No acute left hip abnormality identified. Specifically, no fracture identified. The finding on the radiograph was artifactual. Multifocal lytic and sclerotic lesions throughout the pelvis, compatible with bony metastatic disease.        Assessment :      Hospital Problems             Last Modified POA    * (Principal) Metastatic disease ([address] 1/15/2023 Yes    Intractable pain 1/15/2023 Yes    Lung mass 1/15/2023 Yes    Exertional dyspnea 1/15/2023 Yes    Thoracic back pain 1/15/2023 Yes    Nausea with vomiting 1/15/2023 Yes    Hip pain 1/15/2023 Yes    FTT (failure to thrive) in adult 1/15/2023 Yes    Unintentional weight loss 1/15/2023 Yes    Tobacco abuse 1/15/2023 Yes    Moderate protein-calorie malnutrition ([address] 1/15/2023 Yes       Plan:     Patient status inpatient in the  Med/Surge    Right-sided lung mass with nodules in the right upper lobe as well as left lung with likely metastatic lesions in the thoracic spine and pelvis  Scheduled and as needed breathing treatments, and since primary, pulmonary hygiene  Oral Levaquin for now  Pulmonology consultation  IR consultation for biopsies  Hematology oncology consultation  Nausea with vomiting with moderate calorie malnutrition and unintentional weight loss and epigastric discomfort  Antiemetics as ordered  Video swallow study  Nutritional supplements at mealtimes and snacks  Intractable back and hip pain  Toradol, Percocet, fentanyl  Heavy tobacco use without motivation to quit  Education provided on the need for total abstinence  Nicotine patch      Consultations:   IP CONSULT TO HOSPITALIST  IP CONSULT TO PULMONOLOGY  IP CONSULT TO HEM/ONC    Patient is admitted as inpatient status because of co-morbidities listed above, severity of signs and symptoms as outlined, requirement for current medical therapies and most importantly because of direct risk to patient if care not provided in a hospital setting. Expected length of stay > 48 hours. Alexandra Rizzo, BRYCE - NP  1/15/2023  5:47 PM    Copy sent to Dr. Erich Johnston primary care provider on file.

## 2023-01-15 NOTE — RT PROTOCOL NOTE
RT Inhaler-Nebulizer Bronchodilator Protocol Note    There is a bronchodilator order in the chart from a provider indicating to follow the RT Bronchodilator Protocol and there is an Initiate RT Inhaler-Nebulizer Bronchodilator Protocol order as well (see protocol at bottom of note). CXR Findings:  XR CHEST PORTABLE    Result Date: 1/15/2023  Right suprahilar mass versus infiltrate with possible adjacent mild mediastinal adenopathy. RECOMMENDATION: CT of the chest recommended. The findings from the last RT Protocol Assessment were as follows:   History Pulmonary Disease: Chronic pulmonary disease  Respiratory Pattern: Dyspnea on exertion or RR 21-25 bpm  Breath Sounds: Inspiratory and expiratory or bilateral wheezing and/or rhonchi  Cough: Strong, productive  Indication for Bronchodilator Therapy: Decreased or absent breath sounds  Bronchodilator Assessment Score: 11    Aerosolized bronchodilator medication orders have been revised according to the RT Inhaler-Nebulizer Bronchodilator Protocol below. Respiratory Therapist to perform RT Therapy Protocol Assessment initially then follow the protocol. Repeat RT Therapy Protocol Assessment PRN for score 0-3 or on second treatment, BID, and PRN for scores above 3. No Indications - adjust the frequency to every 6 hours PRN wheezing or bronchospasm, if no treatments needed after 48 hours then discontinue using Per Protocol order mode. If indication present, adjust the RT bronchodilator orders based on the Bronchodilator Assessment Score as indicated below. Use Inhaler orders unless patient has one or more of the following: on home nebulizer, not able to hold breath for 10 seconds, is not alert and oriented, cannot activate and use MDI correctly, or respiratory rate 25 breaths per minute or more, then use the equivalent nebulizer order(s) with same Frequency and PRN reasons based on the score.   If a patient is on this medication at home then do not decrease Frequency below that used at home. 0-3 - enter or revise RT bronchodilator order(s) to equivalent RT Bronchodilator order with Frequency of every 4 hours PRN for wheezing or increased work of breathing using Per Protocol order mode. 4-6 - enter or revise RT Bronchodilator order(s) to two equivalent RT bronchodilator orders with one order with BID Frequency and one order with Frequency of every 4 hours PRN wheezing or increased work of breathing using Per Protocol order mode. 7-10 - enter or revise RT Bronchodilator order(s) to two equivalent RT bronchodilator orders with one order with TID Frequency and one order with Frequency of every 4 hours PRN wheezing or increased work of breathing using Per Protocol order mode. 11-13 - enter or revise RT Bronchodilator order(s) to one equivalent RT bronchodilator order with QID Frequency and an Albuterol order with Frequency of every 4 hours PRN wheezing or increased work of breathing using Per Protocol order mode. Greater than 13 - enter or revise RT Bronchodilator order(s) to one equivalent RT bronchodilator order with every 4 hours Frequency and an Albuterol order with Frequency of every 2 hours PRN wheezing or increased work of breathing using Per Protocol order mode. RT to enter RT Home Evaluation for COPD & MDI Assessment order using Per Protocol order mode.     Electronically signed by Ricky Bullard RCP on 1/15/2023 at 6:22 PM

## 2023-01-15 NOTE — ED NOTES
Dr Holley Tamika at [address] [facility], [facility]  01/15/23 8851

## 2023-01-15 NOTE — ED PROVIDER NOTES
[facility]  eMERGENCY dEPARTMENTeNCOUnter      Pt Name: Cee Hughes  MRN: [id number]  [place] 1955  Date ofevaluation: 1/15/2023  Provider: Natasha De Jesus Dr       Chief Complaint   Patient presents with    Cough    Shortness of Breath    Chest Pain         HISTORY OF PRESENT ILLNESS  (Location/Symptom, Timing/Onset, Context/Setting, Quality, Duration, Modifying Factors, Severity.)   Cee Hughes is a 74 y.o. male who presents to the emergency department with cough shortness of breath left hip pain weakness fatigue weight loss over the last few weeks. Patient reports feeling tired. No definitely been aggravating factors. Symptom described as mild to moderate and constant. Nursing Notes were reviewed. ALLERGIES     Patient has no known allergies. CURRENT MEDICATIONS       There are no discharge medications for this patient. PAST MEDICAL HISTORY   History reviewed. No pertinent past medical history. SURGICAL HISTORY     History reviewed. No pertinent surgical history. HISTORY     History reviewed. No pertinent family history. No family status information on file. SOCIAL HISTORY      reports that he has been smoking cigarettes. He has been smoking an average of 2 packs per day. He has never used smokeless tobacco. He reports that he does not drink alcohol and does not use drugs. REVIEW OFSYSTEMS    (2-9 systems for level 4, 10 or more for level 5)   Review of Systems    Except as noted above the remainder of the review of systems was reviewed and negative. PHYSICAL EXAM    (up to 7 for level 4, 8 or more for level 5)     ED Triage Vitals [01/15/23 1225]   BP Temp Temp Source Heart Rate Resp SpO2 Height Weight   (!) 89/55 98.5 °F (36.9 °C) Oral (!) 103 22 94 % 5' 6\" (1.676 m) 163 lb (73.9 kg)     Physical Exam  Constitutional:       Appearance: He is well-developed. HENT:      Head: Normocephalic and atraumatic.    Cardiovascular: Rate and Rhythm: Normal rate and regular rhythm. Pulmonary:      Effort: Pulmonary effort is normal.      Breath sounds: Normal breath sounds. Abdominal:      Palpations: Abdomen is soft. Musculoskeletal:         General: Normal range of motion. Cervical back: Normal range of motion and neck supple. Legs:    Skin:     General: Skin is warm. Neurological:      Mental Status: He is alert and oriented to person, place, and time. Psychiatric:         Behavior: Behavior normal.               DIAGNOSTIC RESULTS     EKG: All EKG's are interpreted by the Emergency Department Physician who either signs or Co-signs this chart in the absence of a cardiologist.        RADIOLOGY:   Non-plain film images such as CT, Ultrasound and MRI are read by the radiologist. Plain radiographic images arevisualized and preliminarily interpreted by the emergency physician with the below findings:        Interpretation per the Radiologist below, if available at thetime of this note:          ED BEDSIDE ULTRASOUND:   Performed by ED Physician - none    LABS:  Labs Reviewed   CBC WITH AUTO DIFFERENTIAL - Abnormal; Notable for the following components:       Result Value    WBC 14.9 (*)     Platelets 788 (*)     Seg Neutrophils 69 (*)     Lymphocytes 18 (*)     Immature Granulocytes 1 (*)     Segs Absolute 10.43 (*)     Absolute Mono # 1.38 (*)     All other components within normal limits   BASIC METABOLIC PANEL - Abnormal; Notable for the following components:    Glucose 115 (*)     All other components within normal limits   RAPID INFLUENZA A/B ANTIGENS   COVID-19, RAPID   BRAIN NATRIURETIC PEPTIDE   TROP/MYOGLOBIN   TROP/MYOGLOBIN   BASIC METABOLIC PANEL W/ REFLEX TO MG FOR LOW K   PROTIME-INR   PSA, DIAGNOSTIC       All other labs were within normal range or not returned as of this dictation.     EMERGENCY DEPARTMENT COURSE and DIFFERENTIAL DIAGNOSIS/MDM:   Vitals:    Vitals:    01/15/23 1545 01/15/23 1600 01/15/23 1700 01/15/23 1756   BP: 110/82 108/82 106/81 110/79   Pulse: 80 85 88 87   Resp: 13 26 15 18   Temp:    99 °F (37.2 °C)   TempSrc:    Oral   SpO2: 100% 95% 97% 95%   Weight:       Height:         HEARTSCORE: n/a    Patient will be admitted to the hospital.  Raf Parham from oncology called and reported that patient was recently at [address]. He recommends admission to the hospital.  Patient and family agreeable to admission. Pulmonary mass was seen today. Metastasis to the pelvic region. X-ray concerning for fracture of the left hip however CT scan did not reveal a fracture. There is no pulm embolism or underlying pneumonia on work-up and imaging. Patient will be admitted to the hospital.  Patient and family verbalized understanding. No signs of MI OR CHF at this point time. Evaluation and treatment course in the ED, and plan of care upon discharge was discussed in length with the patient. Patient had no further questions prior to being discharged and was instructed to return to the ED for new or worsening symptoms. The patient was involved in his/her plan of care. The testing that was ordered was discussed with the patient. Any medications that may have been ordered were discussed with the patient. I have reviewed the patient's previous medical records using the electronic health record that we have available. Labs and imaging were reviewed. I have discusssed recommendation for admission with the patient and/or family. We have discussed benefits of admission and the risks of discharge home. The patient agrees with the plan of care and admission. The patient will be admitted for further evaluation and treatment. I have consulted NEHA NP FROM [facility] and admission accepted. The patient will be admitted to [facility], he/she agrees to accept the patient for further evaluation and treatment. My attending spoke with dr Almaz Ohara.          Care was provided during an unprecedented national emergency due to the novel coronavirus, Covid-19. CONSULTS:  IP CONSULT TO HOSPITALIST  IP CONSULT TO PULMONOLOGY  IP CONSULT TO HEM/ONC    PROCEDURES:  Procedures        FINAL IMPRESSION      1. Metastatic malignant neoplasm, unspecified site ([facility] [address]    2. Shortness of breath          DISPOSITION/PLAN   DISPOSITION Admitted 01/15/2023 03:28:35 PM      PATIENTREFERRED TO:   No follow-up provider specified. DISCHARGE MEDICATIONS:     There are no discharge medications for this patient.           (Please note that portions of this note were completed with a voice recognition program.  Efforts were made to edit thedictations but occasionally words are mis-transcribed.)    JOSE ARMANDO Ledezma PA-C  01/15/23 5286

## 2023-01-16 ENCOUNTER — APPOINTMENT (OUTPATIENT)
Dept: GENERAL RADIOLOGY | Age: 68
DRG: 988 | End: 2023-01-16
Payer: MEDICAID

## 2023-01-16 ENCOUNTER — APPOINTMENT (OUTPATIENT)
Dept: CT IMAGING | Age: 68
DRG: 988 | End: 2023-01-16
Payer: MEDICAID

## 2023-01-16 VITALS
OXYGEN SATURATION: 93 % | WEIGHT: 156 LBS | HEART RATE: 108 BPM | RESPIRATION RATE: 16 BRPM | HEIGHT: 66 IN | SYSTOLIC BLOOD PRESSURE: 132 MMHG | DIASTOLIC BLOOD PRESSURE: 87 MMHG | BODY MASS INDEX: 25.07 KG/M2 | TEMPERATURE: 98.1 F

## 2023-01-16 LAB
ANION GAP SERPL CALCULATED.3IONS-SCNC: 9 MMOL/L (ref 9–17)
BUN BLDV-MCNC: 15 MG/DL (ref 8–23)
BUN/CREAT BLD: 17 (ref 9–20)
CALCIUM SERPL-MCNC: 8.8 MG/DL (ref 8.6–10.4)
CHLORIDE BLD-SCNC: 107 MMOL/L (ref 98–107)
CO2: 26 MMOL/L (ref 20–31)
CREAT SERPL-MCNC: 0.86 MG/DL (ref 0.7–1.2)
GFR SERPL CREATININE-BSD FRML MDRD: >60 ML/MIN/1.73M2
GLUCOSE BLD-MCNC: 135 MG/DL (ref 70–99)
INR BLD: 1.2
POTASSIUM SERPL-SCNC: 4.6 MMOL/L (ref 3.7–5.3)
PROSTATE SPECIFIC ANTIGEN: 1.05 NG/ML
PROTHROMBIN TIME: 14.9 SEC (ref 11.5–14.2)
SODIUM BLD-SCNC: 142 MMOL/L (ref 135–144)

## 2023-01-16 PROCEDURE — 2580000003 HC RX 258: Performed by: NURSE PRACTITIONER

## 2023-01-16 PROCEDURE — 92611 MOTION FLUOROSCOPY/SWALLOW: CPT

## 2023-01-16 PROCEDURE — 94640 AIRWAY INHALATION TREATMENT: CPT

## 2023-01-16 PROCEDURE — 74230 X-RAY XM SWLNG FUNCJ C+: CPT

## 2023-01-16 PROCEDURE — 6360000004 HC RX CONTRAST MEDICATION: Performed by: INTERNAL MEDICINE

## 2023-01-16 PROCEDURE — 6370000000 HC RX 637 (ALT 250 FOR IP): Performed by: NURSE PRACTITIONER

## 2023-01-16 PROCEDURE — 94761 N-INVAS EAR/PLS OXIMETRY MLT: CPT

## 2023-01-16 PROCEDURE — 88341 IMHCHEM/IMCYTCHM EA ADD ANTB: CPT

## 2023-01-16 PROCEDURE — 99232 SBSQ HOSP IP/OBS MODERATE 35: CPT | Performed by: NURSE PRACTITIONER

## 2023-01-16 PROCEDURE — 88307 TISSUE EXAM BY PATHOLOGIST: CPT

## 2023-01-16 PROCEDURE — 85610 PROTHROMBIN TIME: CPT

## 2023-01-16 PROCEDURE — 97535 SELF CARE MNGMENT TRAINING: CPT

## 2023-01-16 PROCEDURE — 6360000002 HC RX W HCPCS: Performed by: RADIOLOGY

## 2023-01-16 PROCEDURE — 88333 PATH CONSLTJ SURG CYTO XM 1: CPT

## 2023-01-16 PROCEDURE — 97162 PT EVAL MOD COMPLEX 30 MIN: CPT

## 2023-01-16 PROCEDURE — 2709999900 CT BIOPSY SUPERFICIAL BONE PERCUTANEOUS

## 2023-01-16 PROCEDURE — 74177 CT ABD & PELVIS W/CONTRAST: CPT

## 2023-01-16 PROCEDURE — 80048 BASIC METABOLIC PNL TOTAL CA: CPT

## 2023-01-16 PROCEDURE — 36415 COLL VENOUS BLD VENIPUNCTURE: CPT

## 2023-01-16 PROCEDURE — 84153 ASSAY OF PSA TOTAL: CPT

## 2023-01-16 PROCEDURE — 6360000002 HC RX W HCPCS: Performed by: INTERNAL MEDICINE

## 2023-01-16 PROCEDURE — 77012 CT SCAN FOR NEEDLE BIOPSY: CPT

## 2023-01-16 PROCEDURE — 97166 OT EVAL MOD COMPLEX 45 MIN: CPT

## 2023-01-16 PROCEDURE — 97116 GAIT TRAINING THERAPY: CPT

## 2023-01-16 PROCEDURE — 99232 SBSQ HOSP IP/OBS MODERATE 35: CPT | Performed by: INTERNAL MEDICINE

## 2023-01-16 PROCEDURE — 88342 IMHCHEM/IMCYTCHM 1ST ANTB: CPT

## 2023-01-16 PROCEDURE — A4641 RADIOPHARM DX AGENT NOC: HCPCS | Performed by: INTERNAL MEDICINE

## 2023-01-16 PROCEDURE — 0QB33ZX EXCISION OF LEFT PELVIC BONE, PERCUTANEOUS APPROACH, DIAGNOSTIC: ICD-10-PCS | Performed by: RADIOLOGY

## 2023-01-16 RX ORDER — FENTANYL CITRATE 0.05 MG/ML
INJECTION, SOLUTION INTRAMUSCULAR; INTRAVENOUS
Status: COMPLETED | OUTPATIENT
Start: 2023-01-16 | End: 2023-01-16

## 2023-01-16 RX ORDER — BUDESONIDE AND FORMOTEROL FUMARATE DIHYDRATE 160; 4.5 UG/1; UG/1
2 AEROSOL RESPIRATORY (INHALATION) 2 TIMES DAILY
Status: DISCONTINUED | OUTPATIENT
Start: 2023-01-16 | End: 2023-01-16 | Stop reason: HOSPADM

## 2023-01-16 RX ORDER — OXYCODONE HYDROCHLORIDE AND ACETAMINOPHEN 5; 325 MG/1; MG/1
1 TABLET ORAL EVERY 4 HOURS PRN
Qty: 30 TABLET | Refills: 0 | Status: SHIPPED | OUTPATIENT
Start: 2023-01-16 | End: 2023-01-21

## 2023-01-16 RX ORDER — LEVOFLOXACIN 750 MG/1
750 TABLET ORAL DAILY
Qty: 9 TABLET | Refills: 0 | Status: SHIPPED | OUTPATIENT
Start: 2023-01-16 | End: 2023-01-16 | Stop reason: SDUPTHER

## 2023-01-16 RX ORDER — IPRATROPIUM BROMIDE AND ALBUTEROL SULFATE 2.5; .5 MG/3ML; MG/3ML
1 SOLUTION RESPIRATORY (INHALATION) 2 TIMES DAILY
Status: DISCONTINUED | OUTPATIENT
Start: 2023-01-17 | End: 2023-01-16 | Stop reason: HOSPADM

## 2023-01-16 RX ORDER — GUAIFENESIN 600 MG/1
600 TABLET, EXTENDED RELEASE ORAL 2 TIMES DAILY
Qty: 30 TABLET | Refills: 0 | Status: SHIPPED | OUTPATIENT
Start: 2023-01-16 | End: 2023-01-16 | Stop reason: SDUPTHER

## 2023-01-16 RX ORDER — 0.9 % SODIUM CHLORIDE 0.9 %
80 INTRAVENOUS SOLUTION INTRAVENOUS ONCE
Status: DISCONTINUED | OUTPATIENT
Start: 2023-01-16 | End: 2023-01-16 | Stop reason: HOSPADM

## 2023-01-16 RX ORDER — ALBUTEROL SULFATE 2.5 MG/3ML
2.5 SOLUTION RESPIRATORY (INHALATION) EVERY 4 HOURS PRN
Qty: 120 EACH | Refills: 3 | Status: SHIPPED | OUTPATIENT
Start: 2023-01-16 | End: 2023-01-16 | Stop reason: SDUPTHER

## 2023-01-16 RX ORDER — SODIUM CHLORIDE 0.9 % (FLUSH) 0.9 %
10 SYRINGE (ML) INJECTION ONCE
Status: DISCONTINUED | OUTPATIENT
Start: 2023-01-16 | End: 2023-01-16 | Stop reason: HOSPADM

## 2023-01-16 RX ORDER — KETOROLAC TROMETHAMINE 10 MG/1
10 TABLET, FILM COATED ORAL EVERY 6 HOURS PRN
Qty: 20 TABLET | Refills: 1 | Status: SHIPPED | OUTPATIENT
Start: 2023-01-16 | End: 2024-01-16

## 2023-01-16 RX ORDER — METHYLPREDNISOLONE SODIUM SUCCINATE 40 MG/ML
40 INJECTION, POWDER, LYOPHILIZED, FOR SOLUTION INTRAMUSCULAR; INTRAVENOUS EVERY 8 HOURS
Status: DISCONTINUED | OUTPATIENT
Start: 2023-01-16 | End: 2023-01-16 | Stop reason: HOSPADM

## 2023-01-16 RX ORDER — HYDROMORPHONE HYDROCHLORIDE 1 MG/ML
1 INJECTION, SOLUTION INTRAMUSCULAR; INTRAVENOUS; SUBCUTANEOUS ONCE
Status: DISCONTINUED | OUTPATIENT
Start: 2023-01-16 | End: 2023-01-16

## 2023-01-16 RX ORDER — BUDESONIDE AND FORMOTEROL FUMARATE DIHYDRATE 160; 4.5 UG/1; UG/1
2 AEROSOL RESPIRATORY (INHALATION) 2 TIMES DAILY
Qty: 10.2 G | Refills: 3 | Status: SHIPPED | OUTPATIENT
Start: 2023-01-16

## 2023-01-16 RX ORDER — OXYCODONE HYDROCHLORIDE AND ACETAMINOPHEN 5; 325 MG/1; MG/1
1 TABLET ORAL EVERY 4 HOURS PRN
Qty: 30 TABLET | Refills: 0 | Status: SHIPPED | OUTPATIENT
Start: 2023-01-16 | End: 2023-01-16 | Stop reason: SDUPTHER

## 2023-01-16 RX ORDER — ALBUTEROL SULFATE 2.5 MG/3ML
2.5 SOLUTION RESPIRATORY (INHALATION) EVERY 4 HOURS PRN
Qty: 120 EACH | Refills: 3 | Status: SHIPPED | OUTPATIENT
Start: 2023-01-16

## 2023-01-16 RX ORDER — IPRATROPIUM BROMIDE AND ALBUTEROL SULFATE 2.5; .5 MG/3ML; MG/3ML
3 SOLUTION RESPIRATORY (INHALATION)
Qty: 360 ML | Refills: 3 | Status: SHIPPED | OUTPATIENT
Start: 2023-01-16

## 2023-01-16 RX ORDER — IPRATROPIUM BROMIDE AND ALBUTEROL SULFATE 2.5; .5 MG/3ML; MG/3ML
3 SOLUTION RESPIRATORY (INHALATION)
Qty: 360 ML | Refills: 3 | Status: SHIPPED | OUTPATIENT
Start: 2023-01-16 | End: 2023-01-16 | Stop reason: SDUPTHER

## 2023-01-16 RX ORDER — KETOROLAC TROMETHAMINE 10 MG/1
10 TABLET, FILM COATED ORAL EVERY 6 HOURS PRN
Qty: 20 TABLET | Refills: 1 | Status: SHIPPED | OUTPATIENT
Start: 2023-01-16 | End: 2023-01-16 | Stop reason: SDUPTHER

## 2023-01-16 RX ORDER — PREDNISONE 10 MG/1
TABLET ORAL
Qty: 30 TABLET | Refills: 0 | Status: SHIPPED | OUTPATIENT
Start: 2023-01-16 | End: 2023-01-16 | Stop reason: SDUPTHER

## 2023-01-16 RX ORDER — LEVOFLOXACIN 750 MG/1
750 TABLET ORAL DAILY
Qty: 9 TABLET | Refills: 0 | Status: SHIPPED | OUTPATIENT
Start: 2023-01-16 | End: 2023-01-25

## 2023-01-16 RX ORDER — GUAIFENESIN 600 MG/1
600 TABLET, EXTENDED RELEASE ORAL 2 TIMES DAILY
Qty: 30 TABLET | Refills: 0 | Status: SHIPPED | OUTPATIENT
Start: 2023-01-16 | End: 2023-01-31

## 2023-01-16 RX ORDER — PREDNISONE 10 MG/1
TABLET ORAL
Qty: 30 TABLET | Refills: 0 | Status: SHIPPED | OUTPATIENT
Start: 2023-01-16

## 2023-01-16 RX ORDER — NICOTINE 21 MG/24HR
1 PATCH, TRANSDERMAL 24 HOURS TRANSDERMAL DAILY
Status: DISCONTINUED | OUTPATIENT
Start: 2023-01-16 | End: 2023-01-16 | Stop reason: HOSPADM

## 2023-01-16 RX ADMIN — METHYLPREDNISOLONE SODIUM SUCCINATE 40 MG: 40 INJECTION, POWDER, FOR SOLUTION INTRAMUSCULAR; INTRAVENOUS at 02:30

## 2023-01-16 RX ADMIN — LEVOFLOXACIN 750 MG: 500 TABLET, FILM COATED ORAL at 09:07

## 2023-01-16 RX ADMIN — IPRATROPIUM BROMIDE AND ALBUTEROL SULFATE 1 AMPULE: 2.5; .5 SOLUTION RESPIRATORY (INHALATION) at 05:15

## 2023-01-16 RX ADMIN — IPRATROPIUM BROMIDE AND ALBUTEROL SULFATE 1 AMPULE: 2.5; .5 SOLUTION RESPIRATORY (INHALATION) at 14:38

## 2023-01-16 RX ADMIN — METHYLPREDNISOLONE SODIUM SUCCINATE 40 MG: 40 INJECTION, POWDER, FOR SOLUTION INTRAMUSCULAR; INTRAVENOUS at 09:07

## 2023-01-16 RX ADMIN — IOPAMIDOL 75 ML: 755 INJECTION, SOLUTION INTRAVENOUS at 15:08

## 2023-01-16 RX ADMIN — SODIUM CHLORIDE, PRESERVATIVE FREE 10 ML: 5 INJECTION INTRAVENOUS at 15:10

## 2023-01-16 RX ADMIN — FENTANYL CITRATE 50 MCG: 0.05 INJECTION, SOLUTION INTRAMUSCULAR; INTRAVENOUS at 10:50

## 2023-01-16 RX ADMIN — BARIUM SULFATE 450 ML: 20 SUSPENSION ORAL at 15:10

## 2023-01-16 RX ADMIN — Medication 80 ML: at 15:10

## 2023-01-16 RX ADMIN — FENTANYL CITRATE 50 MCG: 0.05 INJECTION, SOLUTION INTRAMUSCULAR; INTRAVENOUS at 11:00

## 2023-01-16 RX ADMIN — SODIUM CHLORIDE: 9 INJECTION, SOLUTION INTRAVENOUS at 05:44

## 2023-01-16 NOTE — CONSULTS
Pulmonary Medicine and Critical Care Consult    Patient Ronan Ludwig   MRN -  [id number]   Bruce # - [de-identified]   - 1955      Date of Admission -  1/15/2023 12:28 PM  Date of evaluation -  1/15/2023  Room - -02   Ailyn Reyes MD Primary Care Physician - No primary care provider on file. Reason for Consult      Lung mass  Assessment   Right lung mass with associated lymphadenopathy and mediastinal invasion highly suspicious for lung cancer  Evidence of bony metastasis  Weight loss  Smoker    Recommendations   Oxygen by nasal cannula  DuoNeb by nebulizer while awake every 4 hours  Solu-Medrol IV 40 every 8 hours  Levaquin  IR consult for biopsy  metastatic bone lesion confirm metastasis  Oncology on consult  Pain control  Smoking cessation  Discussed with patient and family  DVT prophylaxis    Problem List      Patient Active Problem List   Diagnosis    Intractable pain    Lung mass    Exertional dyspnea    Thoracic back pain    Nausea with vomiting    Hip pain    Metastatic malignant neoplasm (HCC)    FTT (failure to thrive) in adult    Unintentional weight loss    Tobacco abuse    Moderate protein-calorie malnutrition ([address]       AVNI Mathews is 74 y.o., [facility] male, previous medical history essentially negative presented to the emergency room for cough and worsening shortness of breath. He reported having chest pain radiating to left arm. He recently was seen at [facility].  He had associated left hip pain and has been having fatigue and weight loss for the last few weeks. Chest x-ray in the ER showed right suprahilar mass. CT chest showed no evidence of PE but confirmed presence of a right medial upper lobe mass 2.3 x 1.4 cm in the right upper lobe perihilar mass with invasion into mediastinum 3 x 3.9 x 4 cm with mediastinal right hilar adenopathy. There is also evidence of right lower lobe subpleural nodule 4.8 mm.   There was lytic lesion at T10. He had CT of the left hip that showed evidence of lytic sclerotic lesions compatible with bony metastasis. I was consulted for further evaluation. Patient's been smoking 2 packs a day for the last 40 years. He is left-sided chest pain is intermittent. He still reports having hip pain  PMHx   Past Medical History  History reviewed. No pertinent past medical history. Past Surgical History    History reviewed. No pertinent surgical history. Meds    Current Medications    sodium chloride flush  5-40 mL IntraVENous 2 times per day    enoxaparin  40 mg SubCUTAneous Daily    ipratropium-albuterol  1 ampule Inhalation Q4H WA    levoFLOXacin  750 mg Oral Daily     sodium chloride flush, sodium chloride, potassium chloride **OR** potassium alternative oral replacement **OR** potassium chloride, magnesium sulfate, ondansetron **OR** ondansetron, polyethylene glycol, acetaminophen **OR** acetaminophen, oxyCODONE-acetaminophen, fentanNYL, ketorolac, albuterol  IV Drips/Infusions   sodium chloride      sodium chloride       Home Medications  No medications prior to admission. Allergies    Patient has no known allergies.   Social History     Social History     Socioeconomic History    Marital status:      Spouse name: Not on file    Number of children: Not on file    Years of education: Not on file    Highest education level: Not on file   Occupational History    Not on file   Tobacco Use    Smoking status: Every Day     Packs/day: 2.00     Types: Cigarettes    Smokeless tobacco: Never   Substance and Sexual Activity    Alcohol use: Never    Drug use: Never    Sexual activity: Not on file   Other Topics Concern    Not on file   Social History Narrative    Not on file     Social Determinants of Health     Financial Resource Strain: Not on file   Food Insecurity: Not on file   Transportation Needs: Not on file   Physical Activity: Not on file   Stress: Not on file   Social Connections: Not on file Intimate Partner Violence: Not on file   Housing Stability: Not on file     Family History    History reviewed. No pertinent family history. ROS - 11 systems   General Denies any fever or chills  HEENT Denies any diplopia, tinnitus or vertigo  Resp as above  Cardiac Denies any palpitations, claudication or edema  GI Denies any melena, hematochezia, hematemesis or pyrosis   Denies any frequency, urgency, hesitancy or incontinence  Heme Denies bruising or bleeding easily  Endocrine Denies any history of diabetes or thyroid disease  Neuro Denies any focal motor deficits  Psychiatric Denies anxiety, depression, suicidal ideation  Skin Denies rashes, itching, open sores    Vitals     height is 5' 6\" (1.676 m) and weight is 163 lb (73.9 kg). His oral temperature is 99 °F (37.2 °C). His blood pressure is 110/79 and his pulse is 87. His respiration is 18 and oxygen saturation is 95%. Body mass index is 26.31 kg/m². I/O    No intake or output data in the 24 hours ending 01/15/23 2026  No intake/output data recorded. Patient Vitals for the past 96 hrs (Last 3 readings):   Weight   01/15/23 1225 163 lb (73.9 kg)     Exam   General Appearance  Awake, alert, oriented, in no acute distress  HEENT - Head is normocephalic, atraumatic. Pupil reactive to light  Neck - Supple, symmetrical, trachea midline and Soft, trachea midline and straight  Lungs -decreased breath sound no crackles or wheezes  Cardiovascular - Heart sounds are normal.  Regular rhythm normal rate without murmur, gallop or rub. Abdomen - Soft, nontender, nondistended, no masses or organomegaly  Neurologic - CN II-XII are grossly intact.  There are no focal motor or sensory deficits  Skin - No bruising or bleeding  Extremities - No cyanosis, clubbing or edema    Labs  - Old records and notes have been reviewed in [facility]   CBC     Lab Results   Component Value Date/Time    WBC 14.9 01/15/2023 12:45 PM    RBC 5.11 01/15/2023 12:45 PM    HGB 14.4 01/15/2023 12:45 PM    HCT 46.4 01/15/2023 12:45 PM     01/15/2023 12:45 PM    MCV 90.8 01/15/2023 12:45 PM    MCH 28.2 01/15/2023 12:45 PM    MCHC 31.0 01/15/2023 12:45 PM    RDW 13.6 01/15/2023 12:45 PM    LYMPHOPCT 18 01/15/2023 12:45 PM    MONOPCT 9 01/15/2023 12:45 PM    BASOPCT 1 01/15/2023 12:45 PM    MONOSABS 1.38 01/15/2023 12:45 PM    LYMPHSABS 2.68 01/15/2023 12:45 PM    EOSABS 0.23 01/15/2023 12:45 PM    BASOSABS 0.13 01/15/2023 12:45 PM     BMP   Lab Results   Component Value Date/Time     01/15/2023 12:45 PM    K 4.2 01/15/2023 12:45 PM     01/15/2023 12:45 PM    CO2 29 01/15/2023 12:45 PM    BUN 12 01/15/2023 12:45 PM    CREATININE 0.87 01/15/2023 12:45 PM    GLUCOSE 115 01/15/2023 12:45 PM    CALCIUM 9.4 01/15/2023 12:45 PM         Radiology        CT chest     1. No evidence of pulmonary embolus. 2.  Mediastinal right hilar adenopathy. 3.  Right upper lobe mass medial aspect 2.3 x 1.4 cm. 4.  Right upper lobe perihilar mass with invasion into the mediastinum a 3 x   3.9 x 4 cm. 5.  Right lower lobe subpleural nodule 4.8 mm.       6.  Hepatic steatosis. Atherosclerotic disease. 7.  Lytic lesion T10. Ct hip    No acute left hip abnormality identified. Specifically, no fracture   identified. The finding on the radiograph was artifactual.       Multifocal lytic and sclerotic lesions throughout the pelvis, compatible with   bony metastatic disease. (See actual reports for details)    \"Thank you for asking us to see this patient\"    Case discussed with nurse and patient/family. Questions and concerns addressed.     Electronically signed by     Kerri Stewart MD on 1/15/2023 at 8:26 PM

## 2023-01-16 NOTE — PLAN OF CARE
[facility]  Office: [address], DO, Saima Collazo, DO, Johnna Roque, DO, Fortino Wilde Blood, DO, Janae Hicks MD, Sergio Dao MD, Mary Wallace MD, Amna Keita MD,  Yesenia Chapman MD, Marilee Kevin MD, Ryan Martínez, DO, Malissa Lawrence MD,  Mckay Ba MD, Armando Melton MD, Kimmy Denny, DO, Anupam Francisco MD, Elsa Ramos MD, Nando Govea, DO, Abhi Porter MD, Sandra Kennedy MD, Audria Brunner, MD, Gilda Patel MD, Booker Rosas, DO, Cyndy Castleman, MD, Chris Iqbal MD, Rigo العراقي, Cori Redd, CNP, Mabel Rangel, CNP, Lena Noe, CNP,  Scot Goods, DNP, Elmer Olmstead, CNP, Joann Conteh, CNP, Natalie White, CNP, Nolvia Ray, CNP, Terra Morris, CNP, Marcela Iqbal PA-C, Brenda Rodriguez, CNS, Aidee Kilgore, CNP, Shila Virk, CNP         [facility]    Second Visit Note  For more detailed information please refer to the progress note of the day      1/16/2023    12:39 PM    Name:   Moe Patel  MRN:     [id number]     [place]:      [de-identified]   Room:   2006/2006-02   Day:  1  Admit Date:  1/15/2023 12:28 PM    PCP:   No primary care provider on file. Code Status:  Full Code      Pt vitals were reviewed   New labs were reviewed   Patient was seen    Updated plan :     Moe Patel was evaluated today and a DME order was entered for a wheeled walker with seat because he requires this to successfully complete daily living tasks of ambulating. A wheeled walker with seat is necessary due to the patient's unsteady gait, upper body weakness, inability to  and ambulation device, ambulating only short distances by pushing a walker, and the need to sit for a short time before resuming ambulation. These tasks cannot be completed with a lesser ambulation device such as a cane, crutch, or standard walker.   The need for this equipment was discussed with the patient and he understands and is in agreement.            BRYCE Ward - NP  1/16/2023  12:39 PM

## 2023-01-16 NOTE — PROGRESS NOTES
Pt discharged to home in good condition with belongings  Discharge instructions given  \"Meds To Beds\" medication at bedside  Pt denies having any further questions at this time  Locked up home medication(s)/personal items given to patient at discharge  Patient/family state they have everything they were admitted with. 21-Dec-2021 18:46

## 2023-01-16 NOTE — CARE COORDINATION
Case Management Initial Discharge Plan  Deonte Ferrer             Met with:patient or family member patient and son to discuss discharge plans. Information verified: address, contacts, phone number, , insurance Yes  PCP: No primary care provider on file. Date of last visit: [facility] list given    Insurance Provider: Self pay    Discharge Planning    Living Arrangements:  Spouse/Significant Other, Children   Support Systems:  Spouse/Significant Other, Children, Family Members    Home has 2nd story apt  15 stairs to climb to get into front door, 15 stairs to climb to reach second floor  Location of bedroom/bathroom in home  - main floor of apt    Patient able to perform ADL's:Independent    Current Services (outpatient & in home) none  DME equipment: none  DME provider: N/A    Pharmacy: [facility]    Potential Assistance Needed:  N/A    Patient agreeable to home care: No  [place] of choice provided:  n/a    Prior SNF/Rehab Placement and Facility: No  Agreeable to SNF/Rehab: No  [place] of choice provided: n/a   Evaluation: n/a    Expected Discharge date:     Patient expects to be discharged to:   home  Follow Up Appointment: Best Day/ Time: Wednesday AM    Transportation provider: teresita  Transportation arrangements needed for discharge: No    Readmission Risk              Risk of Unplanned Readmission:  8             Does patient have a readmission risk score greater than 14?: No  If yes, follow-up appointment must be made within 7 days of discharge. Goal of Care:       Discharge Plan: Met with patient and son at bedside. Pt moved here 6-8 months ago from [facility]. Pt is independent and drives. Has limited understanding of English. Has been feeling SOB for past 2 months. Being worked up for new lung masses with likely metastatic bone lesions in spine and pelvis. Had bone marrow biopsy and video swallow study today. Pt is self pay and Cazenovia Mortensen from [facility] will see pt.   PCP list given.  Oncology and pulmonary consulted. Continue to follow plan of care.                Electronically signed by Daisy Forrester RN on 1/16/23 at 12:00 PM EST

## 2023-01-16 NOTE — PLAN OF CARE
Problem: Discharge Planning  Goal: Discharge to home or other facility with appropriate resources  Outcome: Adequate for Discharge     Problem: Pain  Goal: Verbalizes/displays adequate comfort level or baseline comfort level  Outcome: Adequate for Discharge     Problem: Respiratory - Adult  Goal: Achieves optimal ventilation and oxygenation  1/16/2023 1815 by Trinity Campbell RN  Outcome: Adequate for Discharge  1/16/2023 1812 by Vida Britt RCP  Outcome: Progressing

## 2023-01-16 NOTE — PROCEDURES
INSTRUMENTAL SWALLOW REPORT  MODIFIED BARIUM SWALLOW    NAME: Derik Macias   : 1955  MRN: [id number]       Date of Eval: 2023        Radiologist: Dr. Franco Lebron     Referring Diagnosis(es):      Past Medical History:  has no past medical history on file. Past Surgical History:  has a past surgical history that includes CT BIOPSY SUPERFICIAL BONE PERCUTANEOUS (2023). Type of Study: Initial MBS    Patient Complaints/Reason for Referral:  Derik Macias was referred for a MBS to assess the efficiency of his swallow function, assess for aspiration, and to make recommendations regarding safe dietary consistencies, effective compensatory strategies, and safe eating environment. Behavior/Cognition/Vision/Hearing:  Behavior/Cognition: Alert; Cooperative  Vision: Within Functional Limits  Hearing: Within functional limits    Impressions:  Pt. With no penetration, no aspiration with puree, soft and regular solid and nectar thick liquid.  + hook penetration, no aspiration with thin liquid. Min residual noted with puree, decreased with double swallow. Decreased A-P transit with regular solid. Mild extended mastication with soft and regular solid. Premature spill noted with soft solid. ST recommends regular with thin liquid diet. If s/s of aspiration occur, d/c all PO and recommend NPO and repeat MBSS. Results and recommendations reported to RN. Consistencies Administered: Regular;Easy to Chew;Pureed;Mildly Thick straw; Thin straw    Recommended Diet:  Solid consistency: Regular  Liquid consistency: Thin    Safe Swallow Protocol:     Compensatory Swallowing Strategies : Upright as possible for all oral intake;Eat/Feed slowly; Small bites/sips    Recommendations/Treatment  Requires SLP Intervention: Yes  D/C Recommendations: Ongoing speech therapy is recommended during this hospitalization  Frequency: 1-2 x week    Recommended Exercises:    Therapeutic Interventions: Diet tolerance monitoring;Patient/Family education    Education: Images and recommendations were reviewed with pt. And RN following this exam.   Patient Education: yes      Goals:    Long Term: To Maximize safety with intake, optimize nutrition/hydration and minimize risk for aspiration. Short Term:  Dysphagia Goals: The patient will tolerate recommended diet without observed clinical signs of aspiration      Oral Preparation / Oral Phase  Decreased A-P transit with regular solid. Mild extended mastication with soft and regular solid. Premature spill noted with soft solid. Pharyngeal Phase  Pt. With no penetration, no aspiration with puree, soft and regular solid and nectar thick liquid.  + hook penetration, no aspiration with thin liquid. Min residual noted with puree, decreased with double swallow. Esophageal Phase  Esophageal Screen: WFL    Pain      Pain Level: 0      Therapy Time:   Individual Concurrent Group Co-treatment   Time In  1125         Time Out  1135         Minutes  Wilner Velazco M.A. CCC-SLP, 1/16/2023, 1:13 PM

## 2023-01-16 NOTE — PLAN OF CARE
Problem: Discharge Planning  Goal: Discharge to home or other facility with appropriate resources  Outcome: Progressing     Problem: Pain  Goal: Verbalizes/displays adequate comfort level or baseline comfort level  Outcome: Progressing     Problem: Respiratory - Adult  Goal: Achieves optimal ventilation and oxygenation  1/16/2023 0242 by Chuy Muse RN  Outcome: Progressing

## 2023-01-16 NOTE — PROGRESS NOTES
CLINICAL PHARMACY NOTE: MEDS TO BEDS    Total # of Prescriptions Filled: 8   The following medications were delivered to the patient:  Percocet 5-325  Ketorolac 10mg  Budesonide-form 160-4.5 inhaler  Levofloxacin 750mg  Prednisone 10mg  Ipratropium-albuterol 0.5-2.5 soln  Mucus relief 600mg  Albuterol sulf 2.5mg/3 ml soln    Additional Documentation:

## 2023-01-16 NOTE — PROGRESS NOTES
[facility]  Office: [address], DO, Tim Ana Liliase, DO, Codey Gaitan, DO, Yohana Torres, DO, Socorro Marino MD, Nikolas Moulton MD, Jarrett Vernon MD, Grey Diallo MD,  Saad Mensah MD, Ivan Keita MD, Mirian Romero, DO, Sabrina Bernardo MD,  Silvia Noel MD, Genaro Drake MD, Paola River, DO, Emma Hernandez MD, Milan Donohue MD, Lima Nolasco, DO, Madalyn Koenig MD, Taniya Cash MD, Gayle Jones MD, Sandy Shah MD, Javy Urban, DO, Asher Moreira MD, Sigifredo Macias MD, Candelario Ruffin, CNP,  Genna Rodriguez, CNP, Louis Anders, CNP, Zeyad Husain, CNP,  Bella Baird, [facility], Betty Ordonez, CNP, Anibal Wells, CNP, Nora Zurita, CNP, Cami Mathis, CNP, Jose Carvajal, CNP, YANI CoronaC, Willy Murray, CNS, Nellie Reed, CNP, José Miguel Lazaro, [facility]    Progress Note    1/16/2023    10:03 AM    Name:   Rasheed Cohen  MRN:     [id number]     [place]:      [de-identified]   Room:   2006/2006-02   Day:  1  Admit Date:  1/15/2023 12:28 PM    PCP:   No primary care provider on file. Code Status:  Full Code    Subjective:     C/C:   Chief Complaint   Patient presents with    Cough    Shortness of Breath    Chest Pain     Interval History Status: improved. Condition has improved overnight. Patient reports that his respiratory distress is improved with breathing treatments and he is able to clear some mucus and phlegm. IR and pulmonology have been consulted and we defer to them on the best area for tissue sampling. Oncology is on board and they have recommended abdominal CT.       Options for treatment are discussed with the patient and his family and currently the plan is to complete tissue sampling, ensure his pain is well controlled, his respiratory status is stable, and then transition all treatments to outpatient to complete the work-up for the likely metastatic process he is facing.    Brief History:     1/15 - Patient reports to the emergency department with pelvis and back pain as well as shortness of breath with a cough.  Patient has been suffering from back pain for the past several months and his pain became so severe yesterday that he reported to [facility].  Ridging was completed and patient was found to have a lung mass on CT and he was advised to follow-up with oncology as an outpatient.  Patient returns to the emergency department today with persistent pain and shortness of breath.  Patient endorses exertional dyspnea which is debilitating and severe pain with ambulation.  Additional imaging is completed by our emergency department team and patient is found to have a large right-sided lung mass with nodules as well as areas of concern for metastasis in the thoracic spine as well as the pelvis.  Emergency department evaluation is completed and patient is hemodynamically stable however he is unable to ambulate without debilitating exertional dyspnea and hip pain.    1/16 - Condition has improved overnight.  Patient reports that his respiratory distress is improved with breathing treatments and he is able to clear some mucus and phlegm.  IR and pulmonology have been consulted and we defer to them on the best area for tissue sampling.  Oncology is on board and they have recommended abdominal CT.      Review of Systems:     Constitutional:  Positive for activity change.   HENT:  Negative for sinus pressure and sinus pain.    Eyes:  Negative for photophobia and visual disturbance.   Respiratory:  Positive for cough, chest tightness and shortness of breath.-Improved from yesterday  Gastrointestinal:  Positive for abdominal pain, nausea and vomiting. Negative for diarrhea.   Endocrine: Negative for cold intolerance and heat intolerance.   Genitourinary:  Positive for decreased urine volume. Negative for flank pain and frequency.   Musculoskeletal:  Positive for  arthralgias and back pain. Skin:  Negative for color change and rash. Neurological:  Positive for weakness. Medications: Allergies:  No Known Allergies    Current Meds:   Scheduled Meds:    methylPREDNISolone  40 mg IntraVENous Q8H    nicotine  1 patch TransDERmal Daily    sodium chloride flush  5-40 mL IntraVENous 2 times per day    enoxaparin  40 mg SubCUTAneous Daily    ipratropium-albuterol  1 ampule Inhalation Q4H WA    levoFLOXacin  750 mg Oral Daily     Continuous Infusions:    sodium chloride      sodium chloride 100 mL/hr at 23 0544     PRN Meds: sodium chloride flush, sodium chloride, potassium chloride **OR** potassium alternative oral replacement **OR** potassium chloride, magnesium sulfate, ondansetron **OR** ondansetron, polyethylene glycol, acetaminophen **OR** acetaminophen, oxyCODONE-acetaminophen, fentanNYL, ketorolac, albuterol    Data:     Past Medical History:   has no past medical history on file. Social History:   reports that he has been smoking cigarettes. He has been smoking an average of 2 packs per day. He has never used smokeless tobacco. He reports that he does not drink alcohol and does not use drugs. Family History: History reviewed. No pertinent family history. Vitals:  /64   Pulse 84   Temp 97.9 °F (36.6 °C)   Resp 16   Ht 5' 6\" (1.676 m)   Wt 156 lb (70.8 kg)   SpO2 93%   BMI 25.18 kg/m²   Temp (24hrs), Av.5 °F (36.9 °C), Min:97.9 °F (36.6 °C), Max:99 °F (37.2 °C)    No results for input(s): POCGLU in the last 72 hours. I/O (24Hr):     Intake/Output Summary (Last 24 hours) at 2023 1003  Last data filed at 2023 0241  Gross per 24 hour   Intake 479.74 ml   Output --   Net 479.74 ml       Labs:  Hematology:  Recent Labs     01/15/23  1245 23  0556   WBC 14.9*  --    RBC 5.11  --    HGB 14.4  --    HCT 46.4  --    MCV 90.8  --    MCH 28.2  --    MCHC 31.0  --    RDW 13.6  --    *  --    MPV 9.1  --    INR  --  1.2 Chemistry:  Recent Labs     01/15/23  1245 01/15/23  1445 01/16/23  0556     --  142   K 4.2  --  4.6     --  107   CO2 29  --  26   GLUCOSE 115*  --  135*   BUN 12  --  15   CREATININE 0.87  --  0.86   ANIONGAP 9  --  9   LABGLOM >60  --  >60   CALCIUM 9.4  --  8.8   PSA  --   --  1.05   PROBNP 202  --   --    TROPHS 8 8  --    MYOGLOBIN 49 50  --    No results for input(s): PROT, LABALBU, LABA1C, C7DQMMS, R8HLGXL, FT4, TSH, AST, ALT, LDH, GGT, ALKPHOS, LABGGT, BILITOT, BILIDIR, AMMONIA, AMYLASE, LIPASE, LACTATE, CHOL, HDL, LDLCHOLESTEROL, CHOLHDLRATIO, TRIG, VLDL, WSQ42QP, PHENYTOIN, PHENYF, URICACID, POCGLU in the last 72 hours. ABG:No results found for: POCPH, PHART, PH, POCPCO2, WWE1PVW, PCO2, POCPO2, PO2ART, PO2, POCHCO3, GRB6HBV, HCO3, NBEA, PBEA, BEART, BE, THGBART, THB, KNO5FGK, WZYC4GUT, C3WTCWTH, O2SAT, FIO2  No results found for: SPECIAL  No results found for: CULTURE    Radiology:  XR HIP LEFT (2-3 VIEWS)    Result Date: 1/16/2023  Subtle cortical step-off is noted along the greater trochanter seen only on single frontal view and a nondisplaced fracture is not excluded. Consider further evaluation CT or MRI. XR CHEST PORTABLE    Result Date: 1/15/2023  Right suprahilar mass versus infiltrate with possible adjacent mild mediastinal adenopathy. RECOMMENDATION: CT of the chest recommended. CT CHEST PULMONARY EMBOLISM W CONTRAST    Result Date: 1/15/2023  1. No evidence of pulmonary embolus. 2.  Mediastinal right hilar adenopathy. 3.  Right upper lobe mass medial aspect 2.3 x 1.4 cm. 4.  Right upper lobe perihilar mass with invasion into the mediastinum a 3 x 3.9 x 4 cm. 5.  Right lower lobe subpleural nodule 4.8 mm. 6.  Hepatic steatosis. Atherosclerotic disease. 7.  Lytic lesion T10. RECOMMENDATIONS: Advise consideration of tissue sampling of the right pulmonary masses. PET-CT imaging may be considered.      CT HIP LEFT WO CONTRAST    Result Date: 1/15/2023  No acute left hip abnormality identified. Specifically, no fracture identified. The finding on the radiograph was artifactual. Multifocal lytic and sclerotic lesions throughout the pelvis, compatible with bony metastatic disease.        Physical Examination:        General appearance:  alert, cooperative and no distress  Mental Status:  oriented to person, place and time and normal affect  Lungs:  clear to auscultation bilaterally, normal effort  Heart:  regular rate and rhythm, no murmur  Abdomen:  soft, nontender, nondistended, normal bowel sounds, no masses, hepatomegaly, splenomegaly  Extremities:  no edema, redness, tenderness in the calves  Skin:  no gross lesions, rashes, induration    Assessment:        Hospital Problems             Last Modified POA    * (Principal) Metastatic malignant neoplasm ([address] 1/15/2023 Yes    Intractable pain 1/15/2023 Yes    Lung mass 1/15/2023 Yes    Exertional dyspnea 1/15/2023 Yes    Thoracic back pain 1/15/2023 Yes    Nausea with vomiting 1/15/2023 Yes    Hip pain 1/15/2023 Yes    FTT (failure to thrive) in adult 1/15/2023 Yes    Unintentional weight loss 1/15/2023 Yes    Tobacco abuse 1/15/2023 Yes    Moderate protein-calorie malnutrition ([address] 1/15/2023 Yes       Plan:        Right-sided lung mass with nodules in the right upper lobe as well as left lung with likely metastatic lesions in the thoracic spine and pelvis  Scheduled and as needed breathing treatments, pulmonary hygiene  Oral Levaquin for now  Pulmonology consultation  IR consultation for biopsies  Hematology oncology consultation  Nausea with vomiting with moderate calorie malnutrition and unintentional weight loss and epigastric discomfort  Antiemetics as ordered  Video swallow study  Nutritional supplements at mealtimes and snacks  Intractable back and hip pain  Toradol, Percocet, fentanyl  Heavy tobacco use without motivation to quit  Education provided on the need for total abstinence  Nicotine patch    BRYCE Paez - NP  1/16/2023  10:03 AM

## 2023-01-16 NOTE — PROGRESS NOTES
_                         Today's Date: 1/16/2023  Patient Name: Mike Kumari  Date of admission: 1/15/2023 12:28 PM  Patient's age: 74 y.o., 1955  Admission Dx: Shortness of breath [R06.02]  Intractable pain [R52]  Metastatic malignant neoplasm, unspecified site [facility]) [C79.9]      Requesting Physician: Geeta Palafox MD    CHIEF COMPLAINT: Chest pain. Pelvic pain. Consult for lung mass. INTERIM HISTORY  Patient is seen and evaluated. Feeling okay. He still having cough and chest pain. No hemoptysis or bleeding. IR biopsy scheduled for this morning. HISTORY OF PRESENT ILLNESS:      The patient is a 74 y.o. [address] [facility] male who is admitted to the hospital for further management of newly diagnosed right lung mass. Patient has history of chronic tobacco abuse. He smokes about 2 packs/day for the last 40 years or more. Patient had no cough sputum or hemoptysis. He had chest pain mainly left side on and off for the last few days. He also had pain in the hips over the last several weeks. Pain is mainly in the left pelvic bone. No other complaints today for some time. No GI symptoms. No nausea or vomiting. No urinary symptoms. No other complaints. He was seen emergency room and he had CT scan of the chest PE protocol. No evidence of pulmonary embolism. He had mediastinal right hilar adenopathy. He had a right upper lobe mass in the medial aspect measuring 2.4 x 1.4 cm. He had a right upper lobe perihilar mass with invasion into mediastinum measuring 3 x 3.9 x 4 cm. Pelvic CT scan showed osteolytic and osteoclastic lesions. Smoker of 2 packs/day for the last 40 years. Past Medical History:   has no past medical history on file. Past Surgical History:   has no past surgical history on file. Family History: family history is not on file. Social History:   reports that he has been smoking cigarettes.  He has been smoking an average of 2 packs per day. He has never used smokeless tobacco. He reports that he does not drink alcohol and does not use drugs.    Medications:    Prior to Admission medications    Not on File     Current Facility-Administered Medications   Medication Dose Route Frequency Provider Last Rate Last Admin    methylPREDNISolone sodium (SOLU-MEDROL) injection 40 mg  40 mg IntraVENous Q8H Perez Ortega MD   40 mg at 01/16/23 8953    nicotine (NICODERM CQ) 21 MG/24HR 1 patch  1 patch TransDERmal Daily BRYCE Gautam - CNP        sodium chloride flush 0.9 % injection 5-40 mL  5-40 mL IntraVENous 2 times per day Jane Fly, APRN - NP        sodium chloride flush 0.9 % injection 10 mL  10 mL IntraVENous PRN Jane Fly, APRN - NP        0.9 % sodium chloride infusion   IntraVENous PRN Jane Fly, APRN - NP        potassium chloride (KLOR-CON M) extended release tablet 40 mEq  40 mEq Oral PRN Jane Fly, APRN - NP        Or    potassium bicarb-citric acid (EFFER-K) effervescent tablet 40 mEq  40 mEq Oral PRN Jane Fly, APRN - NP        Or    potassium chloride 10 mEq/100 mL IVPB (Peripheral Line)  10 mEq IntraVENous PRN Jane Fly, APRN - NP        magnesium sulfate 1000 mg in dextrose 5% 100 mL IVPB  1,000 mg IntraVENous PRN Jane Fly, APRN - NP        enoxaparin (LOVENOX) injection 40 mg  40 mg SubCUTAneous Daily Jane Fly, APRN - NP   40 mg at 01/15/23 2139    ondansetron (ZOFRAN-ODT) disintegrating tablet 4 mg  4 mg Oral Q8H PRN Jane Fly, APRN - NP        Or    ondansetron [facility]) injection 4 mg  4 mg IntraVENous Q6H PRN Jane Fly, APRN - NP        polyethylene glycol (GLYCOLAX) packet 17 g  17 g Oral Daily PRN Jane Fly, APRN - NP        acetaminophen (TYLENOL) tablet 650 mg  650 mg Oral Q6H PRN Jane Fly, APRN - NP        Or    acetaminophen (TYLENOL) suppository 650 mg  650 mg Rectal Q6H PRN Jane Fly, APRN - NP        ipratropium-albuterol (DUONEB) nebulizer solution 1 ampule  1 ampule Inhalation Q4H WA Jane Fly, APRN - NP   1 ampule at 01/16/23 0515    oxyCODONE-acetaminophen (PERCOCET) 5-325 MG per tablet 1 tablet  1 tablet Oral Q4H PRN Jane Fly, APRN - NP   1 tablet at 01/15/23 2143    fentaNYL (SUBLIMAZE) injection 50 mcg  50 mcg IntraVENous Q1H PRN Jane Fly, APRN - NP        ketorolac (TORADOL) injection 15 mg  15 mg IntraVENous Q6H PRN Jane Fly, APRN - NP        levoFLOXacin (LEVAQUIN) tablet 750 mg  750 mg Oral Daily Jane Fly, APRN - NP   750 mg at 01/16/23 0907    0.9 % sodium chloride infusion   IntraVENous Continuous Jane Fly, APRN -  mL/hr at 01/16/23 0544 New Bag at 01/16/23 0544    albuterol (PROVENTIL) nebulizer solution 2.5 mg  2.5 mg Nebulization Q2H PRN Osiris Santillan MD           Allergies:  Patient has no known allergies. REVIEW OF SYSTEMS:      General: Positive for weakness and fatigue. No unanticipated weight loss or decreased appetite. No fever or chills. Eyes: No blurred vision, eye pain or double vision. Ears: No hearing problems or drainage. No tinnitus. Throat: No sore throat, problems with swallowing or dysphagia. Respiratory: As above. No cough, sputum or hemoptysis. No shortness of breath. No pleuritic chest pain. Cardiovascular: No chest pain, orthopnea or PND. No lower extremity edema. No palpitation. Gastrointestinal: No problems with swallowing. No abdominal pain or bloating. No nausea or vomiting. No diarrhea or constipation. No GI bleeding. Genitourinary: No dysuria, hematuria, frequency or urgency. Musculoskeletal: As above. .  Dermatologic: No skin rashes or pruritus. No skin lesions or discolorations. Psychiatric: No depression, anxiety, or stress or signs of schizophrenia. No change in mood or affect. Hematologic: No history of bleeding tendency. No bruises or ecchymosis. No history of clotting problems.   Infectious disease: No fever, chills or frequent infections. Endocrine: No polydipsia or polyuria. No temperature intolerance. Neurologic: No headaches or dizziness. No weakness or numbness of the extremities. No changes in balance, coordination,  memory, mentation, behavior. Allergic/Immunologic: No nasal congestion or hives. No repeated infections. PHYSICAL EXAM:      /64   Pulse 84   Temp 97.9 °F (36.6 °C)   Resp 16   Ht 5' 6\" (1.676 m)   Wt 156 lb (70.8 kg)   SpO2 93%   BMI 25.18 kg/m²    Temp (24hrs), Av.5 °F (36.9 °C), Min:97.9 °F (36.6 °C), Max:99 °F (37.2 °C)      General appearance - not in pain or distress  Mental status - alert and oriented  Eyes - pupils equal and reactive, extraocular eye movements intact  Ears - bilateral TM's and external ear canals normal  Nose - normal and patent, no erythema, discharge or polyps  Mouth - mucous membranes moist, pharynx normal without lesions  Neck - supple, no significant adenopathy  Lymphatics - no palpable lymphadenopathy, no hepatosplenomegaly  Chest - clear to auscultation, no wheezes, rales or rhonchi, symmetric air entry  Heart - normal rate, regular rhythm, normal S1, S2, no murmurs, rubs, clicks or gallops  Abdomen - soft, nontender, nondistended, no masses or organomegaly  Neurological - alert, oriented, normal speech, no focal findings or movement disorder noted  Musculoskeletal -tenderness over the left pelvic bone.   No deformity or swelling  Extremities - peripheral pulses normal, no pedal edema, no clubbing or cyanosis  Skin - normal coloration and turgor, no rashes, no suspicious skin lesions noted           DATA:      Labs:       CBC:   Recent Labs     01/15/23  1245   WBC 14.9*   HGB 14.4   HCT 46.4   *     BMP:   Recent Labs     01/15/23  1245 23  0556    142   K 4.2 4.6   CO2 29 26   BUN 12 15   CREATININE 0.87 0.86   LABGLOM >60 >60   GLUCOSE 115* 135*     PT/INR:   Recent Labs     23  0556   PROTIME 14.9*   INR 1.2     APTT:No results for input(s): APTT in the last 72 hours. LIVER PROFILE:No results for input(s): AST, ALT, LABALBU in the last 72 hours. CT HIP LEFT WO CONTRAST  Narrative: EXAMINATION:  CT OF THE LEFT HIP WITHOUT CONTRAST 1/15/2023 10:51 am    TECHNIQUE:  CT of the left hip was performed without the administration of intravenous  contrast.  Multiplanar reformatted images are provided for review. Automated  exposure control, iterative reconstruction, and/or weight based adjustment of  the mA/kV was utilized to reduce the radiation dose to as low as reasonably  achievable. COMPARISON:  Left hip radiograph performed earlier today. HISTORY  ORDERING SYSTEM PROVIDED HISTORY: rule out fx. TECHNOLOGIST PROVIDED HISTORY: Rule out fx. Decision Support Exception - unselect if not a suspected or confirmed  emergency medical condition->Emergency Medical Condition (MA)  Reason for Exam: Hip pain, r/o fracture    FINDINGS:  There is a lytic, destructive lesion seen within the anterior left iliac wing  measuring just under 2 cm (2, 18). Another example of lytic lesion is seen  within the right parasymphyseal pubic bone measuring 2.4 cm (2, 71). Additional lytic and sclerotic lesions are found throughout the pelvis. With that said, no acute pelvic ring or obturator ring fractures are found. Evaluation of the left hip shows no stress, insufficiency, or traumatic  fracture. No trochanteric fractures found. Finding on the radiograph was  artifactual.  No joint effusion is seen on the left. Mild degenerative  changes are noted. On the right, no stress, insufficiency, or traumatic fractures. Mild  degenerative changes. No joint effusion. Limited imaging of the pelvic viscera is unremarkable. Impression: No acute left hip abnormality identified. Specifically, no fracture  identified.   The finding on the radiograph was artifactual.    Multifocal lytic and sclerotic lesions throughout the pelvis, compatible with  bony metastatic disease. CT CHEST PULMONARY EMBOLISM W CONTRAST  Narrative: EXAMINATION:  CTA OF THE CHEST 1/15/2023 1:33 pm    TECHNIQUE:  CTA of the chest was performed after the administration of intravenous  contrast.  Multiplanar reformatted images are provided for review. MIP  images are provided for review. Automated exposure control, iterative  reconstruction, and/or weight based adjustment of the mA/kV was utilized to  reduce the radiation dose to as low as reasonably achievable. COMPARISON:  None. HISTORY:  ORDERING SYSTEM PROVIDED HISTORY: mass. ..rule out pe and pneumonia as [facility]  TECHNOLOGIST PROVIDED HISTORY:  mass. ..rule out pe and pneumonia as [facility]  Decision Support Exception - unselect if not a suspected or confirmed  emergency medical condition->Emergency Medical Condition (MA)  Reason for Exam: Mass? R/o PE and pneumonia. Chest pain, SOB, cough    FINDINGS:  Pulmonary Arteries: Pulmonary arteries are adequately opacified for  evaluation. No evidence of intraluminal filling defect to suggest pulmonary  embolism. Main pulmonary artery is normal in caliber. Mediastinum: Mediastinal and right hilar adenopathy are noted. No acute  aortic abnormality. Heart size is normal.  No pericardial effusion or  epicardial adenopathy is noted. Esophageal walls are thickened mid and  distal area. Hiatal hernia is noted with asymmetry in the walls. Underlying  mass difficult to exclude. Lungs/pleura: Emphysematous changes are present in the lungs. A mass in the  right upper lobe medially of 2.3 x 1.4 cm is noted image 29, series 4. A  right perihilar upper lobe mass with invasion into the mediastinum of 3.0 x  3.9 x 4 cm is noted. Attenuation of the bronchi to the right upper and right  lower lobes is noted due to the mass and mediastinal adenopathy. Trachea is  patent. No effusion. Right lower lobe subpleural nodule of 4.8 mm. Upper Abdomen: Hepatic steatosis is noted.   Atherosclerotic calcification of  the aorta and major branches is noted.    Soft Tissues/Bones: Multilevel degenerative changes are present in the spine.  No axillary adenopathy.  Lytic destruction of 2.3 cm is noted T10.  Impression: 1.  No evidence of pulmonary embolus.    2.  Mediastinal right hilar adenopathy.    3.  Right upper lobe mass medial aspect 2.3 x 1.4 cm.    4.  Right upper lobe perihilar mass with invasion into the mediastinum a 3 x  3.9 x 4 cm.    5.  Right lower lobe subpleural nodule 4.8 mm.    6.  Hepatic steatosis.  Atherosclerotic disease.    7.  Lytic lesion T10.    RECOMMENDATIONS:  Advise consideration of tissue sampling of the right pulmonary masses.  PET-CT imaging may be considered.  XR HIP LEFT (2-3 VIEWS)  Narrative: EXAMINATION:  TWO XRAY VIEWS OF THE LEFT HIP    1/15/2023 12:52 pm    COMPARISON:  None.    HISTORY:  ORDERING SYSTEM PROVIDED HISTORY: Pain  TECHNOLOGIST PROVIDED HISTORY:  Pain  Reason for Exam: Left hip pain    Initial encounter    FINDINGS:  Subtle cortical step-off is noted along the greater trochanter seen only on a  single frontal view.  The left hip joint is otherwise maintained.  No other  acute fracture or dislocation.  Joint spaces and alignment are otherwise  maintained.  Soft tissues are unremarkable.  Impression: Subtle cortical step-off is noted along the greater trochanter seen only on  single frontal view and a nondisplaced fracture is not excluded.  Consider  further evaluation CT or MRI.  XR CHEST PORTABLE  Narrative: EXAMINATION:  ONE XRAY VIEW OF THE CHEST    1/15/2023 12:52 pm    COMPARISON:  None.    HISTORY:  ORDERING SYSTEM PROVIDED HISTORY: cough  TECHNOLOGIST PROVIDED HISTORY:  cough  Reason for Exam: Cough    History of tobacco abuse.    FINDINGS:  Overlying ECG monitor leads.  Cardiac silhouette WNL in size for AP  technique.  Mediastinal structures midline; fullness right paratracheal soft  tissue structures.    Right suprahilar opacity; differential includes infiltrate and  mass.    Remainder lung fields and costophrenic angles clear. Moderate DJD spine and mild degenerative changes acromioclavicular joints. Impression: Right suprahilar mass versus infiltrate with possible adjacent mild  mediastinal adenopathy. RECOMMENDATION:  CT of the chest recommended. IMPRESSION:    Primary Problem  Metastatic malignant neoplasm [facility])    Active Hospital Problems    Diagnosis Date Noted    Intractable pain [R52] 01/15/2023     Priority: Medium    Lung mass [R91.8] 01/15/2023     Priority: Medium    Exertional dyspnea [R06.09] 01/15/2023     Priority: Medium    Thoracic back pain [M54.6] 01/15/2023     Priority: Medium    Nausea with vomiting [R11.2] 01/15/2023     Priority: Medium    Hip pain [M25.559] 01/15/2023     Priority: Medium    Metastatic malignant neoplasm ([facility] [C79.9] 01/15/2023     Priority: Medium    FTT (failure to thrive) in adult [R62.7] 01/15/2023     Priority: Medium    Unintentional weight loss [R63.4] 01/15/2023     Priority: Medium    Tobacco abuse [Z72.0] 01/15/2023     Priority: Medium    Moderate protein-calorie malnutrition ([facility] [address] [E44.0] 01/15/2023     Priority: Medium       RECOMMENDATIONS:  Records and labs and images were reviewed and discussed with the patient and his son. The actual images were reviewed personally and they were explained to the patient and his son in [address] language. Findings are most consistent with primary lung cancer with bone metastasis. Will need a tissue diagnosis. IR biopsy was ordered and will discuss with IR.  CT scan of the abdomen will be done to complete the work-up  Further recommendations will be based on the results of the biopsy and and further molecular genetic testing on the biopsy. In addition patient will have PET CT scan as outpatient. Pain control. Will follow closely with you        Discussed with patient and Nurse.     Jessica Charles MD, MD [place] MD Ann  Hematologist/Medical Oncologist  [facility] hematology oncology physicians                         This note is created with the assistance of a speech recognition program.  While intending to generate a document that actually reflects the content of the visit, the document can still have some errors including those of syntax and sound a like substitutions which may escape proof reading. It such instances, actual meaning can be extrapolated by contextual diversion.

## 2023-01-16 NOTE — FLOWSHEET NOTE
Kristin ID # [id number]  on video I pad logged in & talks w/ patient& Dr. Deborah Ortega for discussion of procedure, questions answered & consent signed.

## 2023-01-16 NOTE — PROGRESS NOTES
Pulmonary Critical Care Progress Note    Patient seen for the follow up of Metastatic malignant neoplasm ([address]     Subjective:    He has been ambulating on room air. He reports having some shortness of breath. He has mostly dry cough. He had mild hemoptysis. He denies chest pain. His hip pain is controlled. Examination:    Vitals: /79   Pulse 95   Temp 98.2 °F (36.8 °C)   Resp 18   Ht 5' 6\" (1.676 m)   Wt 156 lb (70.8 kg)   SpO2 95%   BMI 25.18 kg/m²   SpO2  Av.7 %  Min: 93 %  Max: 100 %  General appearance: alert and cooperative with exam  Neck: No JVD  Lungs: Decreased breath sound no crackles or wheeze  Heart: regular rate and rhythm, S1, S2 normal, no gallop  Abdomen: Soft, non tender, + BS  Extremities: no cyanosis or clubbing. No significant edema    LABs:    CBC:   Recent Labs     01/15/23  1245   WBC 14.9*   HGB 14.4   HCT 46.4   *     BMP:   Recent Labs     01/15/23  1245 23  0556    142   K 4.2 4.6   CO2 29 26   BUN 12 15   CREATININE 0.87 0.86   LABGLOM >60 >60   GLUCOSE 115* 135*     PT/INR:   Recent Labs     23  0556   PROTIME 14.9*   INR 1.2       Radiology:    CT chest      1. No evidence of pulmonary embolus. 2.  Mediastinal right hilar adenopathy. 3.  Right upper lobe mass medial aspect 2.3 x 1.4 cm. 4.  Right upper lobe perihilar mass with invasion into the mediastinum a 3 x   3.9 x 4 cm. 5.  Right lower lobe subpleural nodule 4.8 mm.       6.  Hepatic steatosis. Atherosclerotic disease. 7.  Lytic lesion T10. Ct hip     No acute left hip abnormality identified. Specifically, no fracture   identified. The finding on the radiograph was artifactual.       Multifocal lytic and sclerotic lesions throughout the pelvis, compatible with   bony metastatic disease.        Impression:  Right lung mass with associated lymphadenopathy and mediastinal invasion highly suspicious for lung cancer  Evidence of bony metastasis  Weight loss  Smoker    Recommendations:  Oxygen by nasal cannula  DuoNeb by nebulizer while awake every 4 hours  Solu-Medrol IV 40 every 8 hours  Add Symbicort  Levaquin  Check IR bone biopsy pathology results  Oncology on consult/CT abdomen pelvis ordered  PET scan as outpatient  Pain control  Smoking cessation  Discussed with patient and family at bedside  DVT prophylaxis    Natacha Macedo MD, MD, [facility]  Pulmonary Critical Care and Sleep Medicine,  [facility]  Cell: [phone]  Office: [phone]

## 2023-01-16 NOTE — BRIEF OP NOTE
Brief Postoperative Note    Derik Macias  YOB: 1955  [id number]    Pre-operative Diagnosis: Multiple lytic lesions    Post-operative Diagnosis: Same    Procedure: CT guided core biopsy of left sacral wing    Anesthesia: Local    Surgeons/Assistants: Donavon    Estimated Blood Loss: less than 50     Complications: None    Specimens: Was Obtained: 4 core samples using 18 g needle    Electronically signed by Eleazar Wallace MD on 1/16/2023 at 11:39 AM

## 2023-01-16 NOTE — RT PROTOCOL NOTE
RT Inhaler-Nebulizer Bronchodilator Protocol Note    There is a bronchodilator order in the chart from a provider indicating to follow the RT Bronchodilator Protocol and there is an Initiate RT Inhaler-Nebulizer Bronchodilator Protocol order as well (see protocol at bottom of note). CXR Findings:  XR CHEST PORTABLE    Result Date: 1/15/2023  Right suprahilar mass versus infiltrate with possible adjacent mild mediastinal adenopathy. RECOMMENDATION: CT of the chest recommended. The findings from the last RT Protocol Assessment were as follows:   History Pulmonary Disease: Chronic pulmonary disease  Respiratory Pattern: Dyspnea on exertion or RR 21-25 bpm  Breath Sounds: Slightly diminished and/or crackles  Cough: Strong, spontaneous, non-productive  Indication for Bronchodilator Therapy: Decreased or absent breath sounds  Bronchodilator Assessment Score: 6    Aerosolized bronchodilator medication orders have been revised according to the RT Inhaler-Nebulizer Bronchodilator Protocol below. Respiratory Therapist to perform RT Therapy Protocol Assessment initially then follow the protocol. Repeat RT Therapy Protocol Assessment PRN for score 0-3 or on second treatment, BID, and PRN for scores above 3. No Indications - adjust the frequency to every 6 hours PRN wheezing or bronchospasm, if no treatments needed after 48 hours then discontinue using Per Protocol order mode. If indication present, adjust the RT bronchodilator orders based on the Bronchodilator Assessment Score as indicated below. Use Inhaler orders unless patient has one or more of the following: on home nebulizer, not able to hold breath for 10 seconds, is not alert and oriented, cannot activate and use MDI correctly, or respiratory rate 25 breaths per minute or more, then use the equivalent nebulizer order(s) with same Frequency and PRN reasons based on the score.   If a patient is on this medication at home then do not decrease Frequency below that used at home. 0-3 - enter or revise RT bronchodilator order(s) to equivalent RT Bronchodilator order with Frequency of every 4 hours PRN for wheezing or increased work of breathing using Per Protocol order mode. 4-6 - enter or revise RT Bronchodilator order(s) to two equivalent RT bronchodilator orders with one order with BID Frequency and one order with Frequency of every 4 hours PRN wheezing or increased work of breathing using Per Protocol order mode. 7-10 - enter or revise RT Bronchodilator order(s) to two equivalent RT bronchodilator orders with one order with TID Frequency and one order with Frequency of every 4 hours PRN wheezing or increased work of breathing using Per Protocol order mode. 11-13 - enter or revise RT Bronchodilator order(s) to one equivalent RT bronchodilator order with QID Frequency and an Albuterol order with Frequency of every 4 hours PRN wheezing or increased work of breathing using Per Protocol order mode. Greater than 13 - enter or revise RT Bronchodilator order(s) to one equivalent RT bronchodilator order with every 4 hours Frequency and an Albuterol order with Frequency of every 2 hours PRN wheezing or increased work of breathing using Per Protocol order mode. RT to enter RT Home Evaluation for COPD & MDI Assessment order using Per Protocol order mode.     Electronically signed by charli canada RCP on 1/16/2023 at 2:41 PM

## 2023-01-16 NOTE — PROGRESS NOTES
Occupational Therapy  Facility/Department: [facility] MED SURG  Occupational Therapy Initial Assessment    Name: Babak Partida  : 1955  MRN: [id number]  Date of Service: 2023         OT Equipment Recommendations  Equipment Needed: Yes  Mobility Devices: ADL Assistive Devices  ADL Assistive Devices: Shower Chair with back       RN reports patient is medically stable for therapy treatment this date. Chart reviewed prior to treatment and patient is agreeable for therapy. All lines intact and patient positioned comfortably at end of treatment. All patient needs addressed prior to ending therapy session. Patient Diagnosis(es): The primary encounter diagnosis was Metastatic malignant neoplasm, unspecified site [facility]). A diagnosis of Shortness of breath was also pertinent to this visit. Past Medical History:  has no past medical history on file. Past Surgical History:  has a past surgical history that includes CT BIOPSY SUPERFICIAL BONE PERCUTANEOUS (2023). Assessment   Performance deficits / Impairments: Decreased functional mobility ; Decreased ADL status; Decreased strength;Decreased endurance;Decreased balance;Decreased posture;Decreased safe awareness  Assessment: .Skilled OT is indicated to increase overall ROM, strength, balance, act onofre as well as I/safety awareness in function to return home with assist as needed.   Prognosis: Good  Decision Making: Medium Complexity  REQUIRES OT FOLLOW-UP: Yes  Activity Tolerance  Activity Tolerance: Patient Tolerated treatment well        Plan   Occupational Therapy Plan  Times Per Week: 4-5x/week  Current Treatment Recommendations: Strengthening, Balance training, Functional mobility training, Self-Care / ADL, Safety education & training, Patient/Caregiver education & training, Endurance training, Equipment evaluation, education, & procurement     Restrictions  Restrictions/Precautions  Restrictions/Precautions: Fall Risk, General Precautions  Position Activity Restriction  Other position/activity restrictions: continuous pulse ox    Subjective   General  Chart Reviewed: Yes  Patient assessed for rehabilitation services?: Yes  Additional Pertinent Hx: pt with limited English. Family present and very supportive  Family / Caregiver Present: Yes     Social/Functional History  Social/Functional History  Lives With: Son  Type of Home: Apartment  Home Layout: One level  Home Access: Stairs to enter with rails  Entrance Stairs - Number of Steps: one flight stairs to second story apt. (no elevator)  Entrance Stairs - Rails: Both  Bathroom Shower/Tub: Tub/Shower unit  Bathroom Toilet: Standard  Home Equipment:  (no DME)  Has the patient had two or more falls in the past year or any fall with injury in the past year?: No (family reports that pt. has good balance)  ADL Assistance: [facility] [address]: Independent (shares duties with son)  Ambulation Assistance: Independent  Transfer Assistance: Independent  Active : No  Mode of Transportation: Family  Occupation: Retired  Type of Occupation: owned shops  Leisure & Hobbies: will occ. go to gym with granddaughter to sit in hottub; family will walk with pt. around apt. complex       Objective   Heart Rate: 95  Heart Rate Source: Monitor  BP: 118/79  BP Location: Left upper arm  BP Method: Automatic  MAP (Calculated): 92  Resp: 18  SpO2: 95 %  O2 Device: None (Room air)          Observation/Palpation  Posture: Good  Observation: SpO2 ~95% during session; room air. Safety Devices  Type of Devices: All fall risk precautions in place;Call light within reach; Left in chair;Gait belt;Nurse notified; Chair alarm in place  Balance  Sitting: Intact  Standing: High guard (CGA)  Gait  Overall Level of Assistance: Contact-guard assistance (pt able to complete functional mob in room distances with CGA, no AD. Minimal SOB noted and O2 sats maintained throughout mob and simple ADL completion.  Pt agreeable to sit up in chair and is educated on benefits of OOB activity to prevent further comp)  Interventions: Safety awareness training;Verbal cues  Assistive Device: Gait belt  Toilet Transfers  Toilet - Technique: Ambulating  Equipment Used: Standard toilet  Toilet Transfer: Stand by assistance;Contact guard assistance  AROM: Within functional limits  Strength: Within functional limits  Coordination: Within functional limits  Tone: Normal  Sensation: Intact (occasional numbness in fingers)  ADL  Feeding: Independent  Grooming: Stand by assistance  UE Bathing: Stand by assistance  LE Bathing: Contact guard assistance  UE Dressing: Stand by assistance  LE Dressing: Contact guard assistance  Toileting: Stand by assistance  Additional Comments: pt is able to complete toileting in bathroom this session with O2 sats maintained at ~94% throughout on room air. Pt is fairly steady during mob without AD; SBA/CGA for standing portions of ADLs for safety and monitoring of spO2. Pt also able to yuki gripper socks with SBA sitting at EOB. Bed mobility  Supine to Sit: Supervision  Bed Mobility Comments: up to chair  Transfers  Sit to stand: Contact guard assistance;Stand by assistance  Stand to sit: Contact guard assistance;Stand by assistance  Vision  Vision: Impaired  Vision Exceptions: Wears glasses for reading  Hearing  Hearing: Within functional limits  Cognition  Overall Cognitive Status: WFL  Cognition Comment: Family interpreting for pt. Appears WFL. Orientation  Overall Orientation Status: Within Normal Limits  Perception  Overall Perceptual Status: WFL               Education Given To: Patient; Family  Education Provided: Role of Therapy;Plan of Care;Home Exercise Program;Precautions; Equipment;ADL Adaptive Strategies; Family Education;Transfer Training; Fall Prevention Strategies; Energy Conservation  Education Method: Demonstration;Verbal  Barriers to Learning: Other (Comment) (lang barrier.  Family is very receptive to education)  Education Outcome: Continued education needed                            AM-PAC Score        AM-PAC Inpatient Daily Activity Raw Score: 19 (01/16/23 1317)  AM-PAC Inpatient ADL T-Scale Score : 40.22 (01/16/23 1317)  ADL Inpatient CMS 0-100% Score: 42.8 (01/16/23 1317)  ADL Inpatient CMS G-Code Modifier : CK (01/16/23 1317)           Goals  Short Term Goals  Time Frame for Short Term Goals: by discharge, pt will  Short Term Goal 1: demo S/I with ADL transfers with AD/DME as needed with good safet  Short Term Goal 2: demo S/MI with functional mob in room/household distances with AD as needed and good safety/pacing for ADL completion  Short Term Goal 3: demo S/MI with toileting routine with DME as needed  Short Term Goal 4: demo I with UB ADLs and S/I with LB ADLs with DME as needed and good safety, pacing  Short Term Goal 5: demo and verb good understanding of fall prevention techs, EC/WS techs, breathing techs, B UE HEP, and d/c recommendations  Patient Goals   Patient goals : to go home with family       Therapy Time   Individual Concurrent Group Co-treatment   Time In 0857         Time Out 0932         Minutes 35          Treatment min: [address] OT

## 2023-01-16 NOTE — PROGRESS NOTES
Physical Therapy  Facility/Department: [facility] MED SURG  Physical Therapy Initial Assessment    Name: Oscar Dover  : [birth year]  MRN: [id number]  Date of Service: 2023     HPI: Oscar Dover is a 74 y.o. Non- / non  male who presents with Cough, Shortness of Breath, and Chest Pain and is admitted to the hospital for the management of Metastatic disease ([facility] [address] Patient reports to the emergency department with pelvis and back pain as well as shortness of breath with a cough. Patient has been suffering from back pain for the past several months and his pain became so severe yesterday that he reported to [facility].  Ridging was completed and patient was found to have a lung mass on CT and he was advised to follow-up with oncology as an outpatient. Patient returns to the emergency department today with persistent pain and shortness of breath. Patient endorses exertional dyspnea which is debilitating and severe pain with ambulation. Additional imaging is completed by our emergency department team and patient is found to have a large right-sided lung mass with nodules as well as areas of concern for metastasis in the thoracic spine as well as the pelvis. Emergency department evaluation is completed and patient is hemodynamically stable however he is unable to ambulate without debilitating exertional dyspnea and hip pain. JAYNE Lr reports patient is medically stable for therapy treatment this date. Chart reviewed prior to treatment and patient is agreeable for therapy. All lines intact and patient positioned comfortably at end of treatment. All patient needs addressed prior to ending therapy session. Patient Diagnosis(es): The primary encounter diagnosis was Metastatic malignant neoplasm, unspecified site [facility]). A diagnosis of Shortness of breath was also pertinent to this visit. Past Medical History:  has no past medical history on file.   Past Surgical History:  has a past surgical history that includes CT BIOPSY SUPERFICIAL BONE PERCUTANEOUS (1/16/2023). Assessment   Body Structures, Functions, Activity Limitations Requiring Skilled Therapeutic Intervention: Decreased functional mobility ; Decreased strength;Decreased balance  Assessment: Pt with good tolerance of eval this date. Pt. With language barrier needing family to translate. Strong family support. Pt lives on second floor apartment with no elevator. Pt will benefit from continued acute PT to maintain strength. PT to progress as tolerated. Specific Instructions for Next Treatment: Endurance training; limited english; family helps translate  Therapy Prognosis: Excellent  Barriers to Learning: Language barrier (limited english; family helps translate for pt)  Requires PT Follow-Up: Yes  Activity Tolerance  Activity Tolerance: Patient tolerated evaluation without incident     Plan   Physcial Therapy Plan  General Plan: 5-7 times per week  Specific Instructions for Next Treatment: Endurance training; limited [address] family helps translate  Current Treatment Recommendations: Strengthening, Balance training, Functional mobility training, Transfer training, Stair training, Gait training, Home exercise program, Safety education & training, Patient/Caregiver education & training, Therapeutic activities  Safety Devices  Type of Devices:  All fall risk precautions in place, Call light within reach, Left in chair, Gait belt, Nurse notified, Chair alarm in place  Restraints  Restraints Initially in Place: No     Restrictions  Restrictions/Precautions  Restrictions/Precautions: Fall Risk, General Precautions  Position Activity Restriction  Other position/activity restrictions: continuous pulse ox, R UE IV     Subjective   General  Chart Reviewed: Yes  Patient assessed for rehabilitation services?: Yes  Additional Pertinent Hx: hip pain, thoracic back pain  Response To Previous Treatment: Not applicable  Family / Caregiver Present: Yes  Diagnosis: Metastatic malignant neoplasm  Follows Commands: Within Functional Limits  General Comment  Comments: Pt resting in bed at beginning of session. Pt pleasant and agreeable throughout session. Family in room assists with translating as patient knows limited amounts of English. RN Amado Sauceda gave the OK to see this pt for PT/OT         Social/Functional History  Social/Functional History  Lives With: Son  Type of Home: Apartment  Home Layout: One level  Home Access: Stairs to enter with rails  Entrance Stairs - Number of Steps: one flight stairs to second story apt. (no elevator)  Entrance Stairs - Rails: Both  Bathroom Shower/Tub: Tub/Shower unit  Bathroom Toilet: Standard  Home Equipment:  (no DME)  Has the patient had two or more falls in the past year or any fall with injury in the past year?: No (family reports that pt. has good balance)  ADL Assistance: [address] [place] [address] Avenue: Independent (shares duties with son)  Ambulation Assistance: Independent  Transfer Assistance: Independent  Active : No  Mode of Transportation: Family  Occupation: Retired  Type of Occupation: owned shops  Leisure & Hobbies: will occ. go to gym with granddaughter to sit in hottub; family will walk with pt. around apt. complex  Vision/Hearing  Vision  Vision: Impaired  Vision Exceptions: Wears glasses for reading  Hearing  Hearing: Within functional limits    Cognition   Orientation  Overall Orientation Status: Within Normal Limits  Cognition  Overall Cognitive Status: WFL  Cognition Comment: Family interpreting for pt. Appears WFL. Objective   Heart Rate: 95  Heart Rate Source: Monitor  BP: 118/79  BP Location: Left upper arm  BP Method: Automatic  MAP (Calculated): 92  Resp: 18  SpO2: 95 %  O2 Device: None (Room air)     Observation/Palpation  Posture: Good  Observation: SpO2 ~95% during session; room air.   Gross Assessment  Sensation: Impaired (L arm/hand intermittent numbness; chronic)     AROM RLE (degrees)  RLE AROM: WFL  AROM LLE (degrees)  LLE AROM : WFL  AROM RUE (degrees)  RUE AROM : WFL  RUE General AROM: See OT eval for details  AROM LUE (degrees)  LUE AROM : WFL  LUE General AROM: See OT eval for details  Strength RLE  Strength RLE: WFL  Comment: hip flexion 4+/5 DERICK; all other LE 5/5 DERICK  Strength LLE  Strength LLE: WFL  Comment: hip flexion 4+/5 DERICK; all other LE 5/5 DERICK  Strength RUE  Strength RUE: WFL  Comment: See OT eval for details  Strength LUE  Strength LUE: WFL  Comment: See OT eval for details        Bed mobility  Rolling to Left: Supervision  Rolling to Right: Supervision  Supine to Sit: Supervision  Bed Mobility Comments: up to chair  Transfers  Sit to Stand: Contact guard assistance  Stand to Sit: Contact guard assistance  Ambulation  Surface: Level tile  Device: No Device (Pt pushed IV pole when walking back from bathroom)  Assistance: Contact guard assistance  Quality of Gait: decreased speed, steady in gait  Gait Deviations: Slow Amina; Increased SANTA  Distance: 40 feet x1; 15 feet x1     Balance  Posture: Good  Sitting - Static: Good  Sitting - Dynamic: Good  Standing - Static: Good  Standing - Dynamic: Good;-           OutComes Score     AM-PAC Score  AM-PAC Inpatient Mobility Raw Score : 20 (01/16/23 1330)  AM-PAC Inpatient T-Scale Score : 47.67 (01/16/23 1330)  Mobility Inpatient CMS 0-100% Score: 35.83 (01/16/23 1330)  Mobility Inpatient CMS G-Code Modifier : CJ (01/16/23 1330)       Goals  Short Term Goals  Time Frame for Short Term Goals: 12 visits  Short Term Goal 1: Pt will perform STS [place]  Short Term Goal 2: Pt will perform standing balance exercises with [place]  Short Term Goal 3: Pt will ambulate at least 100 feet, mod Inpep., pacing self to tolerate from an endurance standpoint. Short Term Goal 4: Pt will perform 13 steps with Estiven to safely enter his second floor apartment, pacing self to tolerate from an endurance standpoint.     Short Term Goal 5: Pt will actively participate in at least 30 minutes of PT (ther ex, ther act, balance, gait,  Patient Goals   Patient Goals : To go home       Education  Patient Education  Education Given To: Patient; Family  Education Provided: Plan of Care;Role of Therapy;Home Exercise Program;Transfer Training; Fall Prevention Strategies; Energy Conservation; Family Education  Education Provided Comments: HEP, cues for hand placement during transfers, importance of keep moving while in hospital; educated family on importance of walking with him to improve endurance. Education Method: Demonstration;Verbal  Barriers to Learning: Other (Comment) (Language barrier; family present and translates for pt)  Education Outcome: Verbalized understanding;Demonstrated understanding;Continued education needed      Therapy Time   Individual Concurrent Group Co-treatment   Time In  0857         Time Out  0933         Minutes  36             Treatment time:  26min.      ROBI Woodall, Student PT

## 2023-01-16 NOTE — PROGRESS NOTES
Physical Therapy  {PT ALL NOTES:217141}  The patient is a 74 y.o. [address] [facility] male who is admitted to the hospital for further management of newly diagnosed right lung mass. Patient has history of chronic tobacco abuse. He smokes about 2 packs/day for the last 40 years or more. Patient had no cough sputum or hemoptysis. He had chest pain mainly left side on and off for the last few days. He also had pain in the hips over the last several weeks. Pain is mainly in the left pelvic bone. No other complaints today for some time. No GI symptoms. No nausea or vomiting. No urinary symptoms. No other complaints. He was seen emergency room and he had CT scan of the chest PE protocol. No evidence of pulmonary embolism. He had mediastinal right hilar adenopathy. He had a right upper lobe mass in the medial aspect measuring 2.4 x 1.4 cm. He had a right upper lobe perihilar mass with invasion into mediastinum measuring 3 x 3.9 x 4 cm. Pelvic CT scan showed osteolytic and osteoclastic lesions. Smoker of 2 packs/day for the last 40 years.

## 2023-01-16 NOTE — CARE COORDINATION
Neville Estrada from [employer] for Medicaid and eligible for script assist.  Needs walker and since no insurance number given for [employer].

## 2023-01-17 ENCOUNTER — TELEPHONE (OUTPATIENT)
Dept: ONCOLOGY | Age: 68
End: 2023-01-17

## 2023-01-17 LAB
EKG ATRIAL RATE: 104 BPM
EKG P AXIS: 71 DEGREES
EKG P-R INTERVAL: 164 MS
EKG Q-T INTERVAL: 340 MS
EKG QRS DURATION: 78 MS
EKG QTC CALCULATION (BAZETT): 447 MS
EKG R AXIS: 20 DEGREES
EKG T AXIS: 74 DEGREES
EKG VENTRICULAR RATE: 104 BPM
SURGICAL PATHOLOGY REPORT: NORMAL

## 2023-01-17 PROCEDURE — 93010 ELECTROCARDIOGRAM REPORT: CPT | Performed by: INTERNAL MEDICINE

## 2023-01-17 NOTE — TELEPHONE ENCOUNTER
WRITER CALLED AND LEFT A VM MESSAGE TO SCHEDULE A CONSULT W/ DR Jorge Monahan PER DR Grace Rogel ON 1/20/23

## 2023-01-19 ENCOUNTER — TELEPHONE (OUTPATIENT)
Dept: CASE MANAGEMENT | Age: 68
End: 2023-01-19

## 2023-01-19 NOTE — TELEPHONE ENCOUNTER
Name: Joanie Olson  : 1955  MRN: [id number]    Oncology Navigation- Initial Note:  (Unknown)  Navigator received assignment and reviewing chart now. Pt. Scheduled to F/U with Dr. Alyse Shah . Writer will plan to meet with pt. Face to face F/U .   Electronically signed by Tammi Velez RN on 2023 at 8:41 AM

## 2023-01-20 ENCOUNTER — TELEPHONE (OUTPATIENT)
Dept: ONCOLOGY | Age: 68
End: 2023-01-20

## 2023-01-20 ENCOUNTER — TELEPHONE (OUTPATIENT)
Dept: CASE MANAGEMENT | Age: 68
End: 2023-01-20

## 2023-01-20 ENCOUNTER — INITIAL CONSULT (OUTPATIENT)
Dept: ONCOLOGY | Age: 68
End: 2023-01-20

## 2023-01-20 VITALS
TEMPERATURE: 97.8 F | RESPIRATION RATE: 16 BRPM | HEART RATE: 87 BPM | HEIGHT: 64 IN | SYSTOLIC BLOOD PRESSURE: 134 MMHG | WEIGHT: 154.6 LBS | BODY MASS INDEX: 26.4 KG/M2 | DIASTOLIC BLOOD PRESSURE: 80 MMHG

## 2023-01-20 DIAGNOSIS — C79.51 BONE METASTASIS (HCC): ICD-10-CM

## 2023-01-20 DIAGNOSIS — C34.11 MALIGNANT NEOPLASM OF UPPER LOBE OF RIGHT LUNG (HCC): Primary | ICD-10-CM

## 2023-01-20 PROCEDURE — 1123F ACP DISCUSS/DSCN MKR DOCD: CPT | Performed by: INTERNAL MEDICINE

## 2023-01-20 PROCEDURE — 99202 OFFICE O/P NEW SF 15 MIN: CPT | Performed by: INTERNAL MEDICINE

## 2023-01-20 PROCEDURE — 99215 OFFICE O/P EST HI 40 MIN: CPT | Performed by: INTERNAL MEDICINE

## 2023-01-20 NOTE — DISCHARGE SUMMARY
[facility]  Office: [address], DO, Sophia Cervantes, DO, Lito Rodriguez, DO, Lee Casey Melissa, DO, Josy Hartman MD, Isabelle Luis MD, Anu Briceno MD, Elvi Mojica MD,  Tashia David MD, Kaitlin Lombardi MD, Michelle Woodall, DO, Tamara Haji MD,  Azra Zuñiga MD, Adrianna Guillory MD, Travis Clay DO, Mera Pina MD, Sebastian Haney MD, Latrice Pastrana DO, Marisa Ryan MD, Porfirio Lind MD, Jonna Felipe MD, Rylee Benitez MD, Jaciel Molina DO, Joli Angelucci, MD, Handy Patricia MD, Rupali Montero, CNP,  Juliann Jovel, CNP, Patti Sotelo, CNP, Robert Forde, CNP,  Melonie Antonio, [facility], Evi Fournier, CNP, Hardeep Jackman, CNP, Andra Lopez, CNP, Eliazar Proper, CNP, Fabi Cantu, CNP, Kyle Chapman, PA-C, Tacos Howard, CNS, Flavio Valdez, CNP, Len Gamez, [facility]    Discharge Summary     Patient ID: Jyoti Sky  :  1955   MRN: [id number]     ACCOUNT:  [de-identified]   Patient's PCP: No primary care provider on file. Admit Date: 1/15/2023   Discharge Date: 2023     Length of Stay: 1  Code Status:  Prior  Admitting Physician: Anu Briceno MD  Discharge Physician: BRYCE Ward - EDDIE     Active Discharge Diagnoses:     Hospital Problem Lists:  Principal Problem:    Metastatic malignant neoplasm [facility])  Active Problems:    Intractable pain    Lung mass    Exertional dyspnea    Thoracic back pain    Nausea with vomiting    Hip pain    FTT (failure to thrive) in adult    Unintentional weight loss    Tobacco abuse    Moderate protein-calorie malnutrition ([facility] [address]  Resolved Problems:    * No resolved hospital problems.  *      Admission Condition:  poor     Discharged Condition: fair    Hospital Stay:     Hospital Course:  Jyoti Sky is a 74 y.o. male who was admitted for the management of  Metastatic malignant neoplasm [facility]) , presented to ER with Cough, Shortness of Breath, and Chest Pain    1/15 - Patient reports to the emergency department with pelvis and back pain as well as shortness of breath with a cough. Patient has been suffering from back pain for the past several months and his pain became so severe yesterday that he reported to [facility].  Ridging was completed and patient was found to have a lung mass on CT and he was advised to follow-up with oncology as an outpatient. Patient returns to the emergency department today with persistent pain and shortness of breath. Patient endorses exertional dyspnea which is debilitating and severe pain with ambulation. Additional imaging is completed by our emergency department team and patient is found to have a large right-sided lung mass with nodules as well as areas of concern for metastasis in the thoracic spine as well as the pelvis. Emergency department evaluation is completed and patient is hemodynamically stable however he is unable to ambulate without debilitating exertional dyspnea and hip pain. 1/16 - Condition has improved overnight. Patient reports that his respiratory distress is improved with breathing treatments and he is able to clear some mucus and phlegm. IR and pulmonology have been consulted and we defer to them on the best area for tissue sampling. Oncology is on board and they have recommended abdominal CT.         Significant therapeutic interventions: As above    Significant Diagnostic Studies:   Labs / Micro:  CBC:   Lab Results   Component Value Date/Time    WBC 14.9 01/15/2023 12:45 PM    RBC 5.11 01/15/2023 12:45 PM    HGB 14.4 01/15/2023 12:45 PM    HCT 46.4 01/15/2023 12:45 PM    MCV 90.8 01/15/2023 12:45 PM    MCH 28.2 01/15/2023 12:45 PM    MCHC 31.0 01/15/2023 12:45 PM    RDW 13.6 01/15/2023 12:45 PM     01/15/2023 12:45 PM     BMP:    Lab Results   Component Value Date/Time    GLUCOSE 135 01/16/2023 05:56 AM     01/16/2023 05:56 AM    K 4.6 01/16/2023 05:56 AM  01/16/2023 05:56 AM    CO2 26 01/16/2023 05:56 AM    ANIONGAP 9 01/16/2023 05:56 AM    BUN 15 01/16/2023 05:56 AM    CREATININE 0.86 01/16/2023 05:56 AM    BUNCRER 17 01/16/2023 05:56 AM    CALCIUM 8.8 01/16/2023 05:56 AM    LABGLOM >60 01/16/2023 05:56 AM        Radiology:  XR HIP LEFT (2-3 VIEWS)    Result Date: 1/16/2023  Subtle cortical step-off is noted along the greater trochanter seen only on single frontal view and a nondisplaced fracture is not excluded. Consider further evaluation CT or MRI. CT ABDOMEN PELVIS W IV CONTRAST Additional Contrast? Oral    Result Date: 1/16/2023  1. Mildly enlarged lymph node in the portal caval region which could be metastatic. Multiple lytic bone lesions are again noted and may reflect metastatic disease with the largest in the right pubic body measuring up to 2.4 cm. Elsewhere, there is no evidence of metastatic disease in the abdomen or pelvis. 2. Possible small filling defect along the left posterior aspect of the bladder measuring as much as 5 mm. Malignancy cannot be excluded. Correlation with urine cytology may be helpful. XR CHEST PORTABLE    Result Date: 1/15/2023  Right suprahilar mass versus infiltrate with possible adjacent mild mediastinal adenopathy. RECOMMENDATION: CT of the chest recommended. CT CHEST PULMONARY EMBOLISM W CONTRAST    Result Date: 1/15/2023  1. No evidence of pulmonary embolus. 2.  Mediastinal right hilar adenopathy. 3.  Right upper lobe mass medial aspect 2.3 x 1.4 cm. 4.  Right upper lobe perihilar mass with invasion into the mediastinum a 3 x 3.9 x 4 cm. 5.  Right lower lobe subpleural nodule 4.8 mm. 6.  Hepatic steatosis. Atherosclerotic disease. 7.  Lytic lesion T10. RECOMMENDATIONS: Advise consideration of tissue sampling of the right pulmonary masses. PET-CT imaging may be considered. CT HIP LEFT WO CONTRAST    Result Date: 1/15/2023  No acute left hip abnormality identified.   Specifically, no fracture identified. The finding on the radiograph was artifactual. Multifocal lytic and sclerotic lesions throughout the pelvis, compatible with bony metastatic disease. CT BIOPSY SUPERFICIAL BONE PERCUTANEOUS    Result Date: 1/16/2023  Technically successful CT-guided core biopsy of the left iliac crest lesion. FL MODIFIED BARIUM SWALLOW W VIDEO    Result Date: 1/16/2023  1. Hook like penetration without aspiration with the thin liquid substance. 2. No penetration or aspiration with the remainder of the administered substances. Please see separate speech pathology report for full discussion of findings and recommendations. Consultations:    Consults:     Final Specialist Recommendations/Findings:   IP CONSULT TO HOSPITALIST  IP CONSULT TO PULMONOLOGY  IP CONSULT TO HEM/ONC      The patient was seen and examined on day of discharge and this discharge summary is in conjunction with any daily progress note from day of discharge. Discharge plan:     Disposition: Home    Physician Follow Up:     Vielka Gonzalez MD  [facility] [id number]  [phone]    Schedule an appointment as soon as possible for a visit in 1 week(s)  Follow up care       Requiring Further Evaluation/Follow Up POST HOSPITALIZATION/Incidental Findings: As above    Diet: regular diet    Activity: As tolerated    Instructions to Patient: Follow up with Hem/Onc for results    Discharge Medications:      Medication List        START taking these medications      albuterol (2.5 MG/3ML) 0.083% nebulizer solution  Commonly known as: PROVENTIL  Take 3 mLs by nebulization every 4 hours as needed for Wheezing or Shortness of Breath     budesonide-formoterol 160-4.5 MCG/ACT Aero  Commonly known as: SYMBICORT  Inhale 2 puffs into the lungs in the morning and 2 puffs in the evening.      Compressor/Nebulizer Misc  1 Units by Does not apply route once for 1 dose     guaiFENesin 600 MG extended release tablet  Commonly known as: MUCINEX  Take 1 tablet by mouth 2 times daily for 15 days     ipratropium-albuterol 0.5-2.5 (3) MG/3ML Soln nebulizer solution  Commonly known as: DUONEB  Inhale 3 mLs into the lungs every 4 hours (while awake)     ketorolac 10 MG tablet  Commonly known as: TORADOL  Take 1 tablet by mouth every 6 hours as needed for Pain     levoFLOXacin 750 MG tablet  Commonly known as: Levaquin  Take 1 tablet by mouth daily for 9 days     oxyCODONE-acetaminophen 5-325 MG per tablet  Commonly known as: PERCOCET  Take 1 tablet by mouth every 4 hours as needed for Pain for up to 5 days. Max Daily Amount: 6 tablets     predniSONE 10 MG tablet  Commonly known as: DELTASONE  Take 20 mg twice a day for 3 days, take 20 mg in the morning and 10 mg at night for 3 days, take 10 mg twice a day for 3 days, take 10 mg only in the morning for 3 days, then stop               Where to Get Your Medications        These medications were sent to [facility] [place] [address]      Phone: [phone]   albuterol (2.5 MG/3ML) 0.083% nebulizer solution  budesonide-formoterol 160-4.5 MCG/ACT Aero  Compressor/Nebulizer Misc  guaiFENesin 600 MG extended release tablet  ipratropium-albuterol 0.5-2.5 (3) MG/3ML Soln nebulizer solution  ketorolac 10 MG tablet  levoFLOXacin 750 MG tablet  oxyCODONE-acetaminophen 5-325 MG per tablet  predniSONE 10 MG tablet         Discharge Procedure Orders   DME Order for Andra Jared alfaro OP   Order Comments: You must complete the order parameters below and add the medical necessity documentation for this DME in a separate note.     Folding Walker with Wheels and Seat    Current patient weight: Weight: 156 lb (70.8 kg)  Current patient height: Height: 5' 6\" (167.6 cm)  Diagnosis: Metastatic lung cancer  Duration: Purchase       Time Spent on discharge is  40 mins in patient examination, evaluation, counseling as well as medication reconciliation, prescriptions for required medications, discharge plan and follow up. Electronically signed by   BRYCE Wilson NP  1/20/2023  7:41 AM      Thank you Dr. Zaria Hillman primary care provider on file. for the opportunity to be involved in this patient's care.

## 2023-01-20 NOTE — TELEPHONE ENCOUNTER
[address]  Refer to rad onc  Refer to social work   Rv in 4 weeks , see orders for chemo , plan to start after the visit   RAD/ONC IS ON 1/31/23 @ 11AM IN [facility]  NEW ORDER CHEMO IS PENDING [address] BY RN NAVIGATOR PATRICK WILL REACHOUT TO PT  MD VISIT C1D1  AVS PRINTED W/ INSTRUCTIONS AND GIVEN TO PT ON EXIT

## 2023-01-20 NOTE — PATIENT INSTRUCTIONS
Refer to rad onc  Refer to social work   Rv in 4 weeks , see orders for chemo , plan to start after the visit

## 2023-01-20 NOTE — TELEPHONE ENCOUNTER
Name: Oscar Dover  : 1955  MRN: [id number]    Oncology Navigation Follow-Up Note    Navigator met with pt. And family post MO consultation. Navigation packet given to pt. Along with business card. Pt. Understand very little english. Family translating for pt. HIPPA form completed. Transportation not an issue. Pt. Completed Medicaid application when in the hospital-pending. Pt. Referred to . [facility] to confirm medicaid application processing and to reach out to pt. (Family member-son or daughter included on HIPPA form)  to discuss other financial needs. Tempus not needed at this time nor is PET. Plan to consider MRI Brain down the road.     Electronically signed by Alla Smart RN on 2023 at 3:38 PM

## 2023-01-20 NOTE — PROGRESS NOTES
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 male who  is admitted to the hospital for further management of newly diagnosed right lung mass. Patient has history of chronic tobacco abuse. He smokes about 2 packs/day for the last 40 years or more. Patient had no cough sputum or hemoptysis. He had chest pain mainly left side on and off for the last few days. He also had pain in the hips over the last several weeks. Pain is mainly in the left pelvic bone. No other complaints today for some time. No GI symptoms. No nausea or vomiting. No urinary symptoms. No other complaints. He was seen emergency room and he had CT scan of the chest PE protocol. No evidence of pulmonary embolism. He had mediastinal right hilar adenopathy. He had a right upper lobe mass in the medial aspect measuring 2.4 x 1.4 cm. He had a right upper lobe perihilar mass with invasion into mediastinum measuring 3 x 3.9 x 4 cm. Pelvic CT scan showed osteolytic and osteoclastic lesions. Smoker of 2 packs/day for the last 40 years. Biopsy of the mass showed squamous cell carcinoma of the lung. CT of the abdomen pelvis was essentially negative except for bone metastases. INTERIM HISTORY:  The patient comes in today for a follow up, we reviewed his pathology and imaging study. He continues to have some pain in his pelvic area especially the left hip, no chest pain, he has chronic cough and shortness of breath, no hemoptysis. No weight loss  PAST MEDICAL HISTORY: has no past medical history on file. Otherwise negative    PAST SURGICAL HISTORY: has a past surgical history that includes CT BIOPSY SUPERFICIAL BONE PERCUTANEOUS (1/16/2023).      CURRENT MEDICATIONS:  has a current medication list which includes the following prescription(s): albuterol, budesonide-formoterol, ipratropium-albuterol, prednisone, guaifenesin, levofloxacin, ketorolac, and compressor/nebulizer. ALLERGIES:  has No Known Allergies. FAMILY HISTORY: Denies any malignancy in the family      SOCIAL HISTORY:  reports that he has been smoking cigarettes. He has been smoking an average of 2 packs per day. He has never used smokeless tobacco. He reports that he does not drink alcohol and does not use drugs. REVIEW OF SYSTEMS:  General: no fever or night sweats, Weight is stable. ENT: No double or blurred vision, no tinnitus or hearing problem, no dysphagia or sore throat   Respiratory: No chest pain, no shortness of breath, no cough or hemoptysis. Cardiovascular: Denies chest pain, PND or orthopnea. No L E swelling or palpitations. Gastrointestinal:    No nausea or vomiting, abdominal pain, diarrhea or constipation. Genitourinary: Denies dysuria, hematuria, frequency, urgency or incontinence. Neurological: Denies headaches, decreased LOC, no sensory or motor focal deficits. Musculoskeletal: Pelvic and left hip pain as discussed  Skin: There are no rashes or bleeding. Psychiatric:  No anxiety, no depression. Endocrine: no diabetes or thyroid disease. Hematologic: no bleeding , no adenopathy. PHYSICAL EXAM: Shows a well appearing 74y.o.-year-old male who is not in pain or distress. Vital Signs: Blood pressure 134/80, pulse 87, temperature 97.8 °F (36.6 °C), temperature source Temporal, resp. rate 16, height 5' 4\" (1.626 m), weight 154 lb 9.6 oz (70.1 kg). HEENT: Normocephalic and atraumatic. Pupils are equal, round, reactive to light and accommodation. Extraocular muscles are intact. Neck: Showed no JVD, no carotid bruit . Lungs: Clear to auscultation bilaterally. Heart: Regular without any murmur. Abdomen: Soft, nontender. No hepatosplenomegaly. Extremities: Lower extremities show no edema, clubbing, or cyanosis. Breasts: Examination not done today.  Neuro exam: intact cranial nerves bilaterally no motor or sensory deficit, gait is normal. Lymphatic: no adenopathy appreciated in the supraclavicular, axillary, cervical or inguinal area    Review of radiological studies:   CT chest  Impression   1. No evidence of pulmonary embolus. 2.  Mediastinal right hilar adenopathy. 3.  Right upper lobe mass medial aspect 2.3 x 1.4 cm. 4.  Right upper lobe perihilar mass with invasion into the mediastinum a 3 x   3.9 x 4 cm. 5.  Right lower lobe subpleural nodule 4.8 mm.       6.  Hepatic steatosis. Atherosclerotic disease. 7.  Lytic lesion T10.   CT abdomen pelvis  Impression   1. Mildly enlarged lymph node in the portal caval region which could be   metastatic. Multiple lytic bone lesions are again noted and may reflect   metastatic disease with the largest in the right pubic body measuring up to   2.4 cm. Elsewhere, there is no evidence of metastatic disease in the abdomen   or pelvis. 2. Possible small filling defect along the left posterior aspect of the   bladder measuring as much as 5 mm. Malignancy cannot be excluded. Correlation with urine cytology may be helpful. CT left hip  Impression   No acute left hip abnormality identified. Specifically, no fracture   identified. The finding on the radiograph was artifactual.       Multifocal lytic and sclerotic lesions throughout the pelvis, compatible with   bony metastatic disease.          Review of laboratory data:   Lab Results   Component Value Date    WBC 14.9 (H) 01/15/2023    HGB 14.4 01/15/2023    HCT 46.4 01/15/2023    MCV 90.8 01/15/2023     (H) 01/15/2023       Chemistry        Component Value Date/Time     01/16/2023 0556    K 4.6 01/16/2023 0556     01/16/2023 0556    CO2 26 01/16/2023 0556    BUN 15 01/16/2023 0556    CREATININE 0.86 01/16/2023 0556        Component Value Date/Time    CALCIUM 8.8 01/16/2023 0556          Pathology  Left sacral wing bone, biopsy:     -  Metastatic squamous cell carcinoma.   IMPRESSION:   Metastatic squamous cell lung cancer, right upper lobe with hilar and mediastinal lymph node metastases  Bone metastases  Plan:   I had a long discussion with the patient, explaining pathology and radiological findings.  The patient will require radiation to his left hip to help alleviate his pain after that we will discuss systemic therapy.  We had a long discussion about options for therapy.  Patient is not really interested in any chemotherapy.  We offered treating him with empirically immunotherapy with nivolumab and he is agreeable.  Since he has low-volume metastatic disease, we will radiate the painful bony metastases and start with immunotherapy and see if he responds.  The patient seems to have some financial and social issues.  We will refer him to social work    Leon Juarez MD  Hematologist/Medical Oncologist  [facility]

## 2023-01-31 ENCOUNTER — HOSPITAL ENCOUNTER (OUTPATIENT)
Dept: RADIATION ONCOLOGY | Age: 68
Discharge: HOME | End: 2023-01-31

## 2023-01-31 ENCOUNTER — TELEPHONE (OUTPATIENT)
Dept: INFUSION THERAPY | Facility: MEDICAL CENTER | Age: 68
End: 2023-01-31

## 2023-01-31 VITALS
HEART RATE: 100 BPM | OXYGEN SATURATION: 99 % | SYSTOLIC BLOOD PRESSURE: 116 MMHG | DIASTOLIC BLOOD PRESSURE: 75 MMHG | TEMPERATURE: 98.2 F | RESPIRATION RATE: 18 BRPM | WEIGHT: 155.2 LBS | BODY MASS INDEX: 26.64 KG/M2

## 2023-01-31 PROCEDURE — 99213 OFFICE O/P EST LOW 20 MIN: CPT | Performed by: RADIOLOGY

## 2023-01-31 PROCEDURE — 77290 THER RAD SIMULAJ FIELD CPLX: CPT | Performed by: RADIOLOGY

## 2023-01-31 PROCEDURE — 77334 RADIATION TREATMENT AID(S): CPT | Performed by: RADIOLOGY

## 2023-01-31 PROCEDURE — 77470 SPECIAL RADIATION TREATMENT: CPT | Performed by: RADIOLOGY

## 2023-01-31 ASSESSMENT — PAIN DESCRIPTION - DESCRIPTORS: DESCRIPTORS: ACHING;JABBING

## 2023-01-31 ASSESSMENT — PAIN DESCRIPTION - PAIN TYPE: TYPE: ACUTE PAIN

## 2023-01-31 ASSESSMENT — PAIN DESCRIPTION - ORIENTATION: ORIENTATION: LEFT

## 2023-01-31 ASSESSMENT — PAIN SCALES - GENERAL: PAINLEVEL_OUTOF10: 6

## 2023-01-31 ASSESSMENT — PAIN DESCRIPTION - LOCATION: LOCATION: HIP

## 2023-01-31 NOTE — TELEPHONE ENCOUNTER
New Chemotherapy Order - Dr. Juarez      Pathology -  metastatic squamous cell carcinoma      Opdivo / Yearvoy 12 Cycles       Ht = 162.6 cm     Wt = 70.1 kg     BSA =  1.78     Odivo 3 mg/kg X 66.7 kg =200.1 mg rounded to 200 mg, IV , Q 14 days     Yervoy 1 mg/kg X 70.1 mg =70.1 mg rounded to 70 mg, IV Q 42 days       Chart to  for scheduling , note to pool for 2nd RN check.

## 2023-01-31 NOTE — PROGRESS NOTES
Referring Physician: Dr. Amanda Lopez:    01/31/23 1052   BP: 116/75   Pulse: 100   Resp: 18   Temp: 98.2 °F (36.8 °C)   SpO2: 99%    :     Pain Assessment: 0-10  Pain Level: 6       Wt Readings from Last 1 Encounters:   01/31/23 155 lb 3.2 oz (70.4 kg)        Body mass index is 26.64 kg/m². Immunizations:    Influenza status:    []   Current   [x]   Patient declined    Pneumococcal status:  []   Current  [x]   Patient declined  Covid status:   [x]  Dose #1:                     [x]  Dose #2:     []  Booster:               []  Patient declined    Smoking Status:    [x] Smoker - PPD:1/2-3/4 ppd   [] Nonsmoker - Quit Date:               [] Never a smoker      No chief complaint on file. Cancer Staging  No matching staging information was found for the patient. Prior Radiation Therapy? No   If yes, site treated:   Facility:                             Date:    Concurrent Chemo/radiation? No   If yes, start date:    Prior Chemotherapy? No   If yes    Facility:                             Date:    Prior Hormonal Therapy or Contraceptive Medications? No   If yes,  Medication:                                Duration:    Head and Neck Cancer? No   If yes, please remind physician to place swallow study order and speech therapy order. Pacemaker/Defibrillator/ICD:  No    Do you have any musculoskeletal diseases, Lupus or arthritic conditions? No   If yes, are you currently taking Methotrexate?   []  Yes  [x]  No                     Current Outpatient Medications   Medication Sig Dispense Refill    ketorolac (TORADOL) 10 MG tablet Take 1 tablet by mouth every 6 hours as needed for Pain (Patient not taking: Reported on 1/20/2023) 20 tablet 1    albuterol (PROVENTIL) (2.5 MG/3ML) 0.083% nebulizer solution Take 3 mLs by nebulization every 4 hours as needed for Wheezing or Shortness of Breath 120 each 3    budesonide-formoterol (SYMBICORT) 160-4.5 MCG/ACT AERO Inhale 2 puffs into the lungs in the morning and 2 puffs in the evening. 10.2 g 3    ipratropium-albuterol (DUONEB) 0.5-2.5 (3) MG/3ML SOLN nebulizer solution Inhale 3 mLs into the lungs every 4 hours (while awake) 360 mL 3    predniSONE (DELTASONE) 10 MG tablet Take 20 mg twice a day for 3 days, take 20 mg in the morning and 10 mg at night for 3 days, take 10 mg twice a day for 3 days, take 10 mg only in the morning for 3 days, then stop 30 tablet 0    guaiFENesin (MUCINEX) 600 MG extended release tablet Take 1 tablet by mouth 2 times daily for 15 days 30 tablet 0    Nebulizers (COMPRESSOR/NEBULIZER) MISC 1 Units by Does not apply route once for 1 dose 1 each 3     No current facility-administered medications for this encounter. No past medical history on file. Past Surgical History:   Procedure Laterality Date    CT BIOPSY PERCUTANEOUS SUPERFICIAL BONE  1/16/2023    CT BIOPSY PERCUTANEOUS SUPERFICIAL BONE 1/16/2023 [facility] CT SCAN       No family history on file.     Social History     Socioeconomic History    Marital status:      Spouse name: Not on file    Number of children: Not on file    Years of education: Not on file    Highest education level: Not on file   Occupational History    Not on file   Tobacco Use    Smoking status: Every Day     Packs/day: 2.00     Types: Cigarettes    Smokeless tobacco: Never   Substance and Sexual Activity    Alcohol use: Never    Drug use: Never    Sexual activity: Not on file   Other Topics Concern    Not on file   Social History Narrative    Not on file     Social Determinants of Health     Financial Resource Strain: Not on file   Food Insecurity: Not on file   Transportation Needs: Not on file   Physical Activity: Not on file   Stress: Not on file   Social Connections: Not on file   Intimate Partner Violence: Not on file   Housing Stability: Not on file             FALLS RISK SCREEN  Instructions:  Assess the patient and enter the appropriate indicators that are present for fall risk identification. Total the numbers entered and assign a fall risk score from Table 2.  Reassess patient at a minimum every 12 weeks or with status change. Assessment   Date  1/31/2023     1. Mental Ability: confusion/cognitively impaired 0     2. Elimination Issues: incontinence, frequency 0       3. Ambulatory: use of assistive devices (walker, cane, off-loading devices),        attached to equipment (IV pole, oxygen) 0     4. Sensory Limitations: dizziness, vertigo, impaired vision 0     5. Age less than 65        0     6. Age 72 or greater 1     7. Medication: diuretics, strong analgesics, hypnotics, sedatives,        antihypertensive agents 0   8. Falls:  recent history of falls within the last 3 months (not to include slipping or        tripping) 0   TOTAL 1    If score of 4 or greater was education given? No       TABLE 2   Risk Score Risk Level Plan of Care   0-3 Little or  No Risk 1. Provide assistance as indicated for ambulation activities  2. Reorient confused/cognitively impaired patient  3. Call-light/bell within patient's reach  4. Chair/bed in low position, stretcher/bed with siderails up except when performing patient care activities  5. Educate patient/family/caregiver on falls prevention  6.  Reassess in 12 weeks or with any noted change in patient condition which places them at a risk for a fall   4-6 Moderate Risk 1. Provide assistance as indicated for ambulation activities  2. Reorient confused/cognitively impaired patient  3. Call-light/bell within patient's reach  4. Chair/bed in low position, stretcher/bed with siderails up except when performing patient care activities  5. Educate patient/family/caregiver on falls prevention     7 or   Higher High Risk 1. Place patient in easily observable treatment room  2. Patient attended at all times by family member or staff  3. Provide assistance as indicated for ambulation activities  4.   Reorient confused/cognitively impaired patient  5.  Call-light/bell within patient's reach  6.  Chair/bed in low position, stretcher/bed with siderails up except when performing patient care activities  7.  Educate patient/family/caregiver on falls prevention             Assessment/Plan: Patient was seen today for consultation. He arrives ambulatory with steady gait.  He is accompanied by son and daughter-in-law.  He speaks/understands little English.  He speaks Tajik.  Patient/family decline .  Son prefers to translate for patient.  Mitchell was recently diagnosed with lung cancer and was found to have bone metastasis in sacrum and t-spine which is his area of pain.  Does not take pain medication as prescribed.  States does not like to take it.  Patient with frequent, harsh non-productive cough.  Dr. Joshi notified of assessment and examined patient.  He reviewed the role of radiation therapy in his treatment plan for pain control.  Desired and untoward effects of radiation therapy explained. Questions were answered to their satisfaction.  Informed consent process completed by Dr. Joshi. Patient was provided with fully executed copy of informed consent form. Patient provided with education regarding treatment simulation per Dr. Joshi.  He will have treatment simulation immediately following this appointment.  5 treatments planned to RUL lung nodule/T-spine and left hip.    Patient Pain Score: 6       Pain medications    Goal:  Pain level at tolerable level.    Current Status:  Pain tolerable, patient not taking pain medication as prescribed.  Has not been taking. Writer reinforced that he should patient take pain medication at frequency prescribed when needed to manage pain.         Hannah Lopez RN 1/31/2023 11:03 AM

## 2023-02-01 ENCOUNTER — HOSPITAL ENCOUNTER (OUTPATIENT)
Dept: RADIATION ONCOLOGY | Age: 68
Discharge: HOME | End: 2023-02-01

## 2023-02-01 PROCEDURE — 77300 RADIATION THERAPY DOSE PLAN: CPT | Performed by: RADIOLOGY

## 2023-02-01 PROCEDURE — 77295 3-D RADIOTHERAPY PLAN: CPT | Performed by: RADIOLOGY

## 2023-02-01 PROCEDURE — 77334 RADIATION TREATMENT AID(S): CPT | Performed by: RADIOLOGY

## 2023-02-01 NOTE — CONSULTS
[facility]            Radiation Oncology          [address]          Rod Cuevas \[facility] [place] [facility]\"" [address]        [facility] [phone]        F: [phone]       [place]             2023    Patient: Tootie Ambrocio   YOB: 1955       Dear Dr Wolfgang Denver: Thank you for referring Tootie Ambrocio to me for evaluation. Below are the relevant portions of my assessment and plan of care. If you have questions, please do not hesitate to call me. I look forward to following this patient along with you. Sincerely,    Electronically signed by Hailey Green MD on 2023 at 5:43 PM    CC: Patient Care Team:  Catrachito Mata RN as Nurse Navigator (Oncology)  ------------------------------------------------------------------------------------------------------------------------------------------------------------------------------------------      RADIATION ONCOLOGY NEW PATIENT VISIT:    Date of Service: 2023    Location:  [facility] Radiation Oncology,   [address] [place]   [phone]     Patient ID:   Tootie Ambrocio  : 1955   MRN: [id number]    CHIEF COMPLAINT: \"Hip and back pain\"    DIAGNOSIS:  Cancer Staging  Malignant neoplasm of upper lobe of right lung [facility])  Staging form: Lung, AJCC 8th Edition  - Clinical stage from 2023: Stage IVB (cT2a, cN2, cM1c) - Signed by Hailey Green MD on 2023      HISTORY OF PRESENT ILLNESS:   Tootie Ambrocio is a 74 y.o. male who speaks Slovenian only, and was offered translation services however family declined and his son offered to act as . Patient has a significant history of smoking at least 2 packs of cigarettes per day who presented with back pain and shortness of breath with cough to the ED. Patient was found on CT imaging to have a 4 cm right upper lobe lung mass on January 15, 2023.   Patient was therefore recommended to undergo biopsy with he had done the following day of his left sacral bone which revealed a squamous cell carcinoma. Patient was recommended to also have a CT abdomen pelvis bony involvement including multiple sites of disease. His CT chest did also note a T10 lytic lesion. Patient was seen by medical oncology outpatient and denied any systemic treatments with chemotherapy. Patient has elected to proceed with immunotherapy alone. Patient however has significant pain in his back and left hip and therefore has been referred to us today for consideration of palliative radiation therapy. He has a significant cough and shortness of breath though does continue to smoke. He denies any headaches, dizziness, chest pain, abdominal pain, swelling, or bleeding. Patient does have several inhalers and nebulizers at home. PRIOR RADIATION HISTORY:  No prior history of radiation therapy    PACEMAKER:   None    PAST MEDICAL HISTORY:  No past medical history on file.     PAST SURGICAL HISTORY:  Past Surgical History:   Procedure Laterality Date    CT BIOPSY PERCUTANEOUS SUPERFICIAL BONE  1/16/2023    CT BIOPSY PERCUTANEOUS SUPERFICIAL BONE 1/16/2023 [facility] CT SCAN       MEDICATIONS:    Current Outpatient Medications:     ketorolac (TORADOL) 10 MG tablet, Take 1 tablet by mouth every 6 hours as needed for Pain (Patient not taking: Reported on 1/20/2023), Disp: 20 tablet, Rfl: 1    albuterol (PROVENTIL) (2.5 MG/3ML) 0.083% nebulizer solution, Take 3 mLs by nebulization every 4 hours as needed for Wheezing or Shortness of Breath, Disp: 120 each, Rfl: 3    budesonide-formoterol (SYMBICORT) 160-4.5 MCG/ACT AERO, Inhale 2 puffs into the lungs in the morning and 2 puffs in the evening., Disp: 10.2 g, Rfl: 3    ipratropium-albuterol (DUONEB) 0.5-2.5 (3) MG/3ML SOLN nebulizer solution, Inhale 3 mLs into the lungs every 4 hours (while awake), Disp: 360 mL, Rfl: 3    predniSONE (DELTASONE) 10 MG tablet, Take 20 mg twice a day for 3 days, take 20 mg in the morning and 10 mg at night for 3 days, take 10 mg twice a day for 3 days, take 10 mg only in the morning for 3 days, then stop, Disp: 30 tablet, Rfl: 0    Nebulizers (COMPRESSOR/NEBULIZER) MISC, 1 Units by Does not apply route once for 1 dose, Disp: 1 each, Rfl: 3    ALLERGIES:   No Known Allergies    SOCIAL HISTORY:  Social History     Socioeconomic History    Marital status:    Tobacco Use    Smoking status: Every Day     Packs/day: 2.00     Types: Cigarettes    Smokeless tobacco: Never   Substance and Sexual Activity    Alcohol use: Never    Drug use: Never       FAMILY HISTORY:  No family history on file. REVIEW OF SYSTEMS:    GENERAL/CONSTITUTIONAL: The patient denies fever, fatigue, weakness, weight gain or weight loss. HEENT: The patient denies pain, redness, loss of vision, double or blurred vision. The patient denies ringing in the ears, loss of hearing, hoarseness, trouble swallowing, or painful swallowing. CARDIOVASCULAR: The patient denies chest pain, irregular heartbeats, sudden changes in heartbeat or palpitation. RESPIRATORY: (+)Shortness of breath, cough. The patient denies hemoptysis. GASTROINTESTINAL: The patient denies nausea, vomiting, diarrhea, constipation, blood in the stools. GENITOURINARY: The patient denies difficult urination, pain or burning with urination, blood in the urine. MUSCULOSKELETAL: (+) Back and L hip pain. The patient denies arm, buttock, thigh or calf cramps. No joint or muscle pain. SKIN: The patient denies easy bruising, skin redness, skin rash, hives. NEUROLOGIC: The patient denies headache, dizziness, fainting, muscle spasm, loss of consciousness. ENDOCRINE: The patient denies intolerance to hot or cold temperature, flushing. HEMATOLOGIC/LYMPHATIC: The patient denies anemia, bleeding tendency or clotting tendency. ALLERGIC/IMMUNOLOGIC: The patient denies rhinitis, asthma, skin sensitivity. PYSCHOLOGIC: The patient denies any depression, anxiety, or confusion.     A full 14 point review of systems was performed and assessed and found to be negative except as noted above. PHYSICAL EXAMINATION:     CHAPERONE: Family/friend/companieon Present    ECO Symptomatic but completely ambulatory    VITAL SIGNS: /75   Pulse 100   Temp 98.2 °F (36.8 °C) (Temporal)   Resp 18   Wt 155 lb 3.2 oz (70.4 kg)   SpO2 99%   BMI 26.64 kg/m²   GENERAL:  General appearance is that of a well-nourished, well-developed in no apparent distress. HEENT: Normocephalic, atraumatic, EOMI, moist mucosa, no erythema. NECK:  No adenopathy or a palpable thyroid mass, trachea is midline. LYMPHATICS: No cervical, supraclavicular adenopathy. HEART:  Normal rate and regular rhythm, normal heart sounds. LUNGS:  Pulmonary effort normal. Normal breath sounds. ABDOMEN:  Soft, nontender, non distended. EXTREMITIES:  No edema. No calf tenderness. MSK:  (+) Mid spinal tenderness. Normal ROM. NEUROLOGICAL: Alert and oriented. Strength and sensation intact bilaterally. No focal deficits. PSYCH: Mood normal, behavior normal.  SKIN: No erythema, no desquamation. LABS:  WBC   Date Value Ref Range Status   01/15/2023 14.9 (H) 3.5 - 11.3 k/uL Final     Segs Absolute   Date Value Ref Range Status   01/15/2023 10.43 (H) 1.50 - 8.10 k/uL Final     Hemoglobin   Date Value Ref Range Status   01/15/2023 14.4 13.0 - 17.0 g/dL Final     Platelets   Date Value Ref Range Status   01/15/2023 507 (H) 138 - 453 k/uL Final     No results found for: , CEA  No results found for:   PSA   Date Value Ref Range Status   2023 1.05 <4.1 ng/mL Final     Comment:     The Roche \"ECLIA\" assay is used. Results obtained with different assay methods cannot be   used interchangeably. IMAGIN/15/23 CT Chest  Impression   1. No evidence of pulmonary embolus. 2.  Mediastinal right hilar adenopathy. 3.  Right upper lobe mass medial aspect 2.3 x 1.4 cm.        4.  Right upper lobe perihilar mass with invasion into the mediastinum a 3 x   3.9 x 4 cm. 5.  Right lower lobe subpleural nodule 4.8 mm.       6.  Hepatic steatosis. Atherosclerotic disease. 7.  Lytic lesion T10.         1/16/23 CT Abd/Pel  Impression   1. Mildly enlarged lymph node in the portal caval region which could be   metastatic. Multiple lytic bone lesions are again noted and may reflect   metastatic disease with the largest in the right pubic body measuring up to   2.4 cm. Elsewhere, there is no evidence of metastatic disease in the abdomen   or pelvis. 2. Possible small filling defect along the left posterior aspect of the   bladder measuring as much as 5 mm. Malignancy cannot be excluded. Correlation with urine cytology may be helpful. PATHOLOGY:  1/16/23 Biopsy  -- Diagnosis --     Left sacral wing bone, biopsy:     -  Metastatic squamous cell carcinoma. ASSESSMENT AND PLAN:   Ha Payne is a 74 y.o. male with a Cancer Staging  Malignant neoplasm of upper lobe of right lung [facility])  Staging form: Lung, AJCC 8th Edition  - Clinical stage from 1/16/2023: Stage IVB (cT2a, cN2, cM1c) - Signed by Cy Sánchez MD on 2/1/2023       We reviewed with the patient the testing that has been done so far, including imaging and pathology results. We also reviewed their staging based on the testing that as been done and the recommendations per the NCCN guidelines. We discussed the options of treatment, including surgery, chemotherapy, and radiation, and the rationale of why radiation therapy would be indicated for this diagnosis. We also discussed that they have the option to not pursue our recommended treatment as well. Given his multiple sites of metastatic disease, I would recommend palliative treatment directed to his bony lesions at T10 and left hip. Given his significant cough we can also offer to include the right upper lobe lung mass in her treatment field.   However we do recommend patient use guaifenesin and dextromethorphan cough syrup to help with his symptoms and to reduce smoking as it is causing further irritation of his lungs and breathing. We reviewed the logistics of radiation treatment planning, including a CT Simulation session, as well as daily treatments for approximately 1 weeks. With regards to radiation to the lung and T10, I discussed the possible short-term side effects of skin irritation (causing redness, dryness, or peeling), tiredness, low blood counts (causing infection or bleeding), inflammation of the lungs (causing shortness of breath and cough), trouble/pain with swallowing and hair loss in treated area, or hemoptysis. Possible long-term side effects discussed included hyperpigmentation of the skin, scarring of the lungs (causing shortness of breath and cough), chronic dysphagia, damage to the nerves (causing numbness, weakness, or paralysis) and damage to the heart. With regards to radiation to the L hip, I discussed the possible short-term side effects which included skin irritation (causing redness, dryness or peeling), hair loss in treated area, tiredness, low blood counts (causing infection or bleeding), abdominal pain, nausea/vomiting and diarrhea. Possible long-term side effects discussed included hyperpigmentation of the skin, damage to the bowels or intestines (resulting in bleeding or obstruction that may require surgery), damage to the kidneys (resulting in decreased function), and damage to the nerves (causing numbness, weakness or paralysis). Patient was advised on smoking cessation, as it can make toxicities worse during treatment and increase risk of other cancers. After ample time to review the patient's questions, an informed consent was obtained to proceed with a treatment planning session for radiation therapy. Patient was in agreement with my recommendations. All questions were answered to their satisfaction.  Patient was advised to contact us anytime should they have any questions or concerns. Electronically signed by Kelsi Palm MD on 1/31/2023 at 5:43 PM      Drugs Prescribed:  New Prescriptions    No medications on file       Orders Placed:   No orders of the defined types were placed in this encounter. CC:  Patient Care Team:  Darin Carballo RN as Nurse Navigator (Oncology)         Total time spent on this case on the day of encounter is 60 minutes. This time includes combination of one or more of the following - review of necessary tests, review of pertinent medical records from the EMR, performing medically appropriate examination and evaluation, counseling and educating the patient/family/caregiver, ordering necessary medical tests, procedures etc., documenting the clinical information in the electronic medical record, care coordination, referring and communicating with other health care providers and interpretation of results independently. This note is created with the assistance of a speech recognition program.  While intending to generate a document that actually reflects the content of the visit, the document can still have some errors including those of syntax and sound a like substitutions which may escape proof reading. It such instances, actual meaning can be extrapolated by contextual diversion.

## 2023-02-02 ENCOUNTER — TELEPHONE (OUTPATIENT)
Dept: CASE MANAGEMENT | Age: 68
End: 2023-02-02

## 2023-02-02 PROCEDURE — 77336 RADIATION PHYSICS CONSULT: CPT | Performed by: RADIOLOGY

## 2023-02-02 NOTE — TELEPHONE ENCOUNTER
Name: Demond Barnard  : 1955  MRN: [id number]    Oncology Navigation Follow-Up Note    Navigator spoke with Dr. Anupama Silver and confirming pt. To start Opdivo/Yervoy post RTX, Angel Alba made aware.    Electronically signed by Julio Cesar Ace RN on 2023 at 2:18 PM

## 2023-02-08 ENCOUNTER — HOSPITAL ENCOUNTER (OUTPATIENT)
Dept: RADIATION ONCOLOGY | Age: 68
Discharge: HOME | End: 2023-02-08

## 2023-02-08 DIAGNOSIS — C79.51 SECONDARY MALIGNANT NEOPLASM OF BONE (HCC): ICD-10-CM

## 2023-02-08 DIAGNOSIS — C34.11 MALIGNANT NEOPLASM OF UPPER LOBE, RIGHT BRONCHUS OR LUNG (HCC): ICD-10-CM

## 2023-02-08 LAB
RAD ONC MSQ ACTUAL FRACTIONS DELIVERED: 1
RAD ONC MSQ ACTUAL FRACTIONS DELIVERED: 1
RAD ONC MSQ ACTUAL SESSION DELIVERED DOSE: 400 CGRAY
RAD ONC MSQ ACTUAL SESSION DELIVERED DOSE: 400 CGRAY
RAD ONC MSQ ACTUAL TOTAL DOSE: 400 CGRAY
RAD ONC MSQ ACTUAL TOTAL DOSE: 400 CGRAY
RAD ONC MSQ ELAPSED DAYS: 0
RAD ONC MSQ ELAPSED DAYS: 0
RAD ONC MSQ LAST DATE: NORMAL
RAD ONC MSQ LAST DATE: NORMAL
RAD ONC MSQ PRESCRIBED FRACTIONAL DOSE: 400 CGRAY
RAD ONC MSQ PRESCRIBED FRACTIONAL DOSE: 400 CGRAY
RAD ONC MSQ PRESCRIBED NUMBER OF FRACTIONS: 5
RAD ONC MSQ PRESCRIBED NUMBER OF FRACTIONS: 5
RAD ONC MSQ PRESCRIBED TECHNIQUE: NORMAL
RAD ONC MSQ PRESCRIBED TECHNIQUE: NORMAL
RAD ONC MSQ PRESCRIBED TOTAL DOSE: 2000 CGRAY
RAD ONC MSQ PRESCRIBED TOTAL DOSE: 2000 CGRAY
RAD ONC MSQ START DATE: NORMAL
RAD ONC MSQ START DATE: NORMAL
RAD ONC MSQ TREATMENT COURSE NUMBER: 1
RAD ONC MSQ TREATMENT COURSE NUMBER: 1
RAD ONC MSQ TREATMENT SITE: NORMAL
RAD ONC MSQ TREATMENT SITE: NORMAL

## 2023-02-08 PROCEDURE — 77412 RADIATION TX DELIVERY LVL 3: CPT | Performed by: RADIOLOGY

## 2023-02-08 PROCEDURE — 77280 THER RAD SIMULAJ FIELD SMPL: CPT | Performed by: RADIOLOGY

## 2023-02-08 PROCEDURE — 77387 GUIDANCE FOR RADJ TX DLVR: CPT | Performed by: RADIOLOGY

## 2023-02-09 ENCOUNTER — HOSPITAL ENCOUNTER (OUTPATIENT)
Dept: RADIATION ONCOLOGY | Age: 68
Discharge: HOME | End: 2023-02-09

## 2023-02-09 DIAGNOSIS — C34.11 MALIGNANT NEOPLASM OF UPPER LOBE, RIGHT BRONCHUS OR LUNG (HCC): ICD-10-CM

## 2023-02-09 DIAGNOSIS — C79.51 SECONDARY MALIGNANT NEOPLASM OF BONE (HCC): ICD-10-CM

## 2023-02-09 LAB
RAD ONC MSQ ACTUAL FRACTIONS DELIVERED: 2
RAD ONC MSQ ACTUAL SESSION DELIVERED DOSE: 400 CGRAY
RAD ONC MSQ ACTUAL TOTAL DOSE: 800 CGRAY
RAD ONC MSQ ELAPSED DAYS: 1
RAD ONC MSQ LAST DATE: NORMAL
RAD ONC MSQ PRESCRIBED FRACTIONAL DOSE: 400 CGRAY
RAD ONC MSQ PRESCRIBED NUMBER OF FRACTIONS: 5
RAD ONC MSQ PRESCRIBED TECHNIQUE: NORMAL
RAD ONC MSQ PRESCRIBED TOTAL DOSE: 2000 CGRAY
RAD ONC MSQ START DATE: NORMAL
RAD ONC MSQ TREATMENT COURSE NUMBER: 1
RAD ONC MSQ TREATMENT SITE: NORMAL

## 2023-02-09 PROCEDURE — 77412 RADIATION TX DELIVERY LVL 3: CPT | Performed by: RADIOLOGY

## 2023-02-09 PROCEDURE — 77387 GUIDANCE FOR RADJ TX DLVR: CPT | Performed by: RADIOLOGY

## 2023-02-10 ENCOUNTER — HOSPITAL ENCOUNTER (OUTPATIENT)
Dept: RADIATION ONCOLOGY | Age: 68
Discharge: HOME | End: 2023-02-10

## 2023-02-10 DIAGNOSIS — C34.11 MALIGNANT NEOPLASM OF UPPER LOBE, RIGHT BRONCHUS OR LUNG (HCC): ICD-10-CM

## 2023-02-10 DIAGNOSIS — C79.51 SECONDARY MALIGNANT NEOPLASM OF BONE (HCC): ICD-10-CM

## 2023-02-10 LAB
RAD ONC MSQ ACTUAL FRACTIONS DELIVERED: 3
RAD ONC MSQ ACTUAL SESSION DELIVERED DOSE: 400 CGRAY
RAD ONC MSQ ACTUAL TOTAL DOSE: 1200 CGRAY
RAD ONC MSQ ELAPSED DAYS: 2
RAD ONC MSQ LAST DATE: NORMAL
RAD ONC MSQ PRESCRIBED FRACTIONAL DOSE: 400 CGRAY
RAD ONC MSQ PRESCRIBED NUMBER OF FRACTIONS: 5
RAD ONC MSQ PRESCRIBED TECHNIQUE: NORMAL
RAD ONC MSQ PRESCRIBED TOTAL DOSE: 2000 CGRAY
RAD ONC MSQ START DATE: NORMAL
RAD ONC MSQ TREATMENT COURSE NUMBER: 1
RAD ONC MSQ TREATMENT SITE: NORMAL

## 2023-02-10 PROCEDURE — 77412 RADIATION TX DELIVERY LVL 3: CPT | Performed by: RADIOLOGY

## 2023-02-10 PROCEDURE — 77387 GUIDANCE FOR RADJ TX DLVR: CPT | Performed by: RADIOLOGY

## 2023-05-25 NOTE — ACP (ADVANCE CARE PLANNING)
Advance Care Planning     Advance Care Planning Activator (Inpatient)  Conversation Note      Date of ACP Conversation: 1/16/2023     Conversation Conducted with: Patient with Decision Making Capacity    ACP Activator: Mary Guo RN    {When Decision Maker makes decisions on behalf of the incapacitated patient: Decision Maker is asked to consider and make decisions based on patient values, known preferences, or best interests. Health Care Decision Maker:     Current Designated Health Care Decision Maker:     Primary Decision Maker: Nancy Mondragon [facility] - [phone]  Click here to complete Healthcare Decision Makers including section of the Healthcare Decision Maker Relationship (ie \"Primary\")      Care Preferences    Ventilation: \"If you were in your present state of health and suddenly became very ill and were unable to breathe on your own, what would your preference be about the use of a ventilator (breathing machine) if it were available to you? \"      Would the patient desire the use of ventilator (breathing machine)?: yes    \"If your health worsens and it becomes clear that your chance of recovery is unlikely, what would your preference be about the use of a ventilator (breathing machine) if it were available to you? \"     Would the patient desire the use of ventilator (breathing machine)?: Yes      Resuscitation  \"CPR works best to restart the heart when there is a sudden event, like a heart attack, in someone who is otherwise healthy. Unfortunately, CPR does not typically restart the heart for people who have serious health conditions or who are very sick. \"    \"In the event your heart stopped as a result of an underlying serious health condition, would you want attempts to be made to restart your heart (answer \"yes\" for attempt to resuscitate) or would you prefer a natural death (answer \"no\" for do not attempt to resuscitate)? \" yes       [] Yes   [x] No   Educated Patient / Decision Maker regarding differences between Advance Directives and portable DNR orders.     Length of ACP Conversation in minutes:  5    Conversation Outcomes:  [x] ACP discussion completed  [] Existing advance directive reviewed with patient; no changes to patient's previously recorded wishes  [] New Advance Directive completed  [] Portable Do Not Rescitate prepared for Provider review and signature  [] POLST/POST/MOLST/MOST prepared for Provider review and signature      Follow-up plan:    [] Schedule follow-up conversation to continue planning  [] Referred individual to Provider for additional questions/concerns   [] Advised patient/agent/surrogate to review completed ACP document and update if needed with changes in condition, patient preferences or care setting    [] This note routed to one or more involved healthcare providers [Well Developed] : well developed [Well Nourished] : well nourished [No Acute Distress] : no acute distress [Obese] : obese [Normal Conjunctiva] : normal conjunctiva [Normal Venous Pressure] : normal venous pressure [No Carotid Bruit] : no carotid bruit [Normal S1, S2] : normal S1, S2 [No Murmur] : no murmur [No Rub] : no rub [No Gallop] : no gallop [Clear Lung Fields] : clear lung fields [Good Air Entry] : good air entry [No Respiratory Distress] : no respiratory distress  [Soft] : abdomen soft [Non Tender] : non-tender [No Masses/organomegaly] : no masses/organomegaly [Normal Bowel Sounds] : normal bowel sounds [Normal Gait] : normal gait [No Edema] : no edema [No Cyanosis] : no cyanosis [No Clubbing] : no clubbing [No Varicosities] : no varicosities [No Rash] : no rash [No Skin Lesions] : no skin lesions [Moves all extremities] : moves all extremities [No Focal Deficits] : no focal deficits [Normal Speech] : normal speech [Alert and Oriented] : alert and oriented [Normal memory] : normal memory